# Patient Record
Sex: FEMALE | Race: ASIAN | NOT HISPANIC OR LATINO | Employment: FULL TIME | URBAN - METROPOLITAN AREA
[De-identification: names, ages, dates, MRNs, and addresses within clinical notes are randomized per-mention and may not be internally consistent; named-entity substitution may affect disease eponyms.]

---

## 2017-03-15 ENCOUNTER — OFFICE VISIT - HEALTHEAST (OUTPATIENT)
Dept: INTERNAL MEDICINE | Facility: CLINIC | Age: 57
End: 2017-03-15

## 2017-03-15 DIAGNOSIS — E78.5 HLD (HYPERLIPIDEMIA): ICD-10-CM

## 2017-03-15 DIAGNOSIS — I10 HTN (HYPERTENSION): ICD-10-CM

## 2017-03-15 ASSESSMENT — MIFFLIN-ST. JEOR: SCORE: 987.05

## 2017-04-17 ENCOUNTER — OFFICE VISIT - HEALTHEAST (OUTPATIENT)
Dept: INTERNAL MEDICINE | Facility: CLINIC | Age: 57
End: 2017-04-17

## 2017-04-17 DIAGNOSIS — I10 HTN (HYPERTENSION): ICD-10-CM

## 2017-04-17 DIAGNOSIS — E78.5 HLD (HYPERLIPIDEMIA): ICD-10-CM

## 2017-04-18 ENCOUNTER — AMBULATORY - HEALTHEAST (OUTPATIENT)
Dept: LAB | Facility: CLINIC | Age: 57
End: 2017-04-18

## 2017-04-18 ENCOUNTER — AMBULATORY - HEALTHEAST (OUTPATIENT)
Dept: NURSING | Facility: CLINIC | Age: 57
End: 2017-04-18

## 2017-04-18 ENCOUNTER — COMMUNICATION - HEALTHEAST (OUTPATIENT)
Dept: INTERNAL MEDICINE | Facility: CLINIC | Age: 57
End: 2017-04-18

## 2017-04-18 DIAGNOSIS — I10 HTN (HYPERTENSION): ICD-10-CM

## 2017-04-18 DIAGNOSIS — E78.5 HLD (HYPERLIPIDEMIA): ICD-10-CM

## 2017-04-18 LAB
CHOLEST SERPL-MCNC: 195 MG/DL
FASTING STATUS PATIENT QL REPORTED: ABNORMAL
HDLC SERPL-MCNC: 48 MG/DL
LDLC SERPL CALC-MCNC: 111 MG/DL
TRIGL SERPL-MCNC: 181 MG/DL

## 2017-05-22 ENCOUNTER — COMMUNICATION - HEALTHEAST (OUTPATIENT)
Dept: INTERNAL MEDICINE | Facility: CLINIC | Age: 57
End: 2017-05-22

## 2017-05-24 ENCOUNTER — COMMUNICATION - HEALTHEAST (OUTPATIENT)
Dept: INTERNAL MEDICINE | Facility: CLINIC | Age: 57
End: 2017-05-24

## 2017-05-25 ENCOUNTER — COMMUNICATION - HEALTHEAST (OUTPATIENT)
Dept: INTERNAL MEDICINE | Facility: CLINIC | Age: 57
End: 2017-05-25

## 2017-06-01 ENCOUNTER — COMMUNICATION - HEALTHEAST (OUTPATIENT)
Dept: INTERNAL MEDICINE | Facility: CLINIC | Age: 57
End: 2017-06-01

## 2017-07-20 ENCOUNTER — COMMUNICATION - HEALTHEAST (OUTPATIENT)
Dept: ADMINISTRATIVE | Facility: CLINIC | Age: 57
End: 2017-07-20

## 2017-08-31 ENCOUNTER — OFFICE VISIT - HEALTHEAST (OUTPATIENT)
Dept: INTERNAL MEDICINE | Facility: CLINIC | Age: 57
End: 2017-08-31

## 2017-08-31 DIAGNOSIS — Z12.11 ENCOUNTER FOR SCREENING FECAL OCCULT BLOOD TESTING: ICD-10-CM

## 2017-08-31 DIAGNOSIS — I10 HTN (HYPERTENSION): ICD-10-CM

## 2017-08-31 DIAGNOSIS — K59.00 CONSTIPATION: ICD-10-CM

## 2018-05-08 ENCOUNTER — COMMUNICATION - HEALTHEAST (OUTPATIENT)
Dept: INTERNAL MEDICINE | Facility: CLINIC | Age: 58
End: 2018-05-08

## 2018-05-15 ENCOUNTER — TELEPHONE (OUTPATIENT)
Dept: OTHER | Facility: CLINIC | Age: 58
End: 2018-05-15

## 2018-05-15 NOTE — TELEPHONE ENCOUNTER
5/15/2018    Call Regarding [facility] OTHER    Attempt 1    Message DECLINED    Comments:       Outreach   RA

## 2018-07-16 ENCOUNTER — COMMUNICATION - HEALTHEAST (OUTPATIENT)
Dept: INTERNAL MEDICINE | Facility: CLINIC | Age: 58
End: 2018-07-16

## 2018-09-07 ENCOUNTER — COMMUNICATION - HEALTHEAST (OUTPATIENT)
Dept: INTERNAL MEDICINE | Facility: CLINIC | Age: 58
End: 2018-09-07

## 2018-10-24 ENCOUNTER — OFFICE VISIT - HEALTHEAST (OUTPATIENT)
Dept: INTERNAL MEDICINE | Facility: CLINIC | Age: 58
End: 2018-10-24

## 2018-10-24 DIAGNOSIS — H53.9 VISION CHANGES: ICD-10-CM

## 2018-10-24 DIAGNOSIS — I10 HTN (HYPERTENSION): ICD-10-CM

## 2018-10-24 LAB
ANION GAP SERPL CALCULATED.3IONS-SCNC: 8 MMOL/L (ref 5–18)
BUN SERPL-MCNC: 20 MG/DL (ref 8–22)
CALCIUM SERPL-MCNC: 10.2 MG/DL (ref 8.5–10.5)
CHLORIDE BLD-SCNC: 103 MMOL/L (ref 98–107)
CO2 SERPL-SCNC: 31 MMOL/L (ref 22–31)
CREAT SERPL-MCNC: 0.89 MG/DL (ref 0.6–1.1)
GFR SERPL CREATININE-BSD FRML MDRD: >60 ML/MIN/1.73M2
GLUCOSE BLD-MCNC: 83 MG/DL (ref 70–125)
POTASSIUM BLD-SCNC: 4.3 MMOL/L (ref 3.5–5)
SODIUM SERPL-SCNC: 142 MMOL/L (ref 136–145)

## 2018-10-25 ENCOUNTER — COMMUNICATION - HEALTHEAST (OUTPATIENT)
Dept: INTERNAL MEDICINE | Facility: CLINIC | Age: 58
End: 2018-10-25

## 2019-01-24 ENCOUNTER — OFFICE VISIT - HEALTHEAST (OUTPATIENT)
Dept: INTERNAL MEDICINE | Facility: CLINIC | Age: 59
End: 2019-01-24

## 2019-01-24 DIAGNOSIS — R82.90 CLOUDY URINE: ICD-10-CM

## 2019-01-24 DIAGNOSIS — N95.2 VAGINAL ATROPHY: ICD-10-CM

## 2019-01-24 DIAGNOSIS — I10 ESSENTIAL HYPERTENSION: ICD-10-CM

## 2019-01-24 LAB
ALBUMIN UR-MCNC: NEGATIVE MG/DL
ANION GAP SERPL CALCULATED.3IONS-SCNC: 11 MMOL/L (ref 5–18)
APPEARANCE UR: CLEAR
BILIRUB UR QL STRIP: NEGATIVE
BUN SERPL-MCNC: 17 MG/DL (ref 8–22)
CALCIUM SERPL-MCNC: 9.7 MG/DL (ref 8.5–10.5)
CHLORIDE BLD-SCNC: 102 MMOL/L (ref 98–107)
CO2 SERPL-SCNC: 29 MMOL/L (ref 22–31)
COLOR UR AUTO: YELLOW
CREAT SERPL-MCNC: 1.18 MG/DL (ref 0.6–1.1)
GFR SERPL CREATININE-BSD FRML MDRD: 47 ML/MIN/1.73M2
GLUCOSE BLD-MCNC: 83 MG/DL (ref 70–125)
GLUCOSE UR STRIP-MCNC: NEGATIVE MG/DL
HGB UR QL STRIP: NEGATIVE
KETONES UR STRIP-MCNC: NEGATIVE MG/DL
LEUKOCYTE ESTERASE UR QL STRIP: NEGATIVE
NITRATE UR QL: NEGATIVE
PH UR STRIP: 5.5 [PH] (ref 5–8)
POTASSIUM BLD-SCNC: 3.6 MMOL/L (ref 3.5–5)
SODIUM SERPL-SCNC: 142 MMOL/L (ref 136–145)
SP GR UR STRIP: 1.01 (ref 1–1.03)
UROBILINOGEN UR STRIP-ACNC: NORMAL

## 2019-01-25 ENCOUNTER — AMBULATORY - HEALTHEAST (OUTPATIENT)
Dept: INTERNAL MEDICINE | Facility: CLINIC | Age: 59
End: 2019-01-25

## 2019-01-25 ENCOUNTER — COMMUNICATION - HEALTHEAST (OUTPATIENT)
Dept: INTERNAL MEDICINE | Facility: CLINIC | Age: 59
End: 2019-01-25

## 2019-01-25 DIAGNOSIS — I10 ESSENTIAL HYPERTENSION: ICD-10-CM

## 2019-05-01 ENCOUNTER — COMMUNICATION - HEALTHEAST (OUTPATIENT)
Dept: INTERNAL MEDICINE | Facility: CLINIC | Age: 59
End: 2019-05-01

## 2019-11-05 ENCOUNTER — COMMUNICATION - HEALTHEAST (OUTPATIENT)
Dept: INTERNAL MEDICINE | Facility: CLINIC | Age: 59
End: 2019-11-05

## 2019-11-05 DIAGNOSIS — I10 ESSENTIAL HYPERTENSION: ICD-10-CM

## 2020-01-16 ENCOUNTER — OFFICE VISIT - HEALTHEAST (OUTPATIENT)
Dept: FAMILY MEDICINE | Facility: CLINIC | Age: 60
End: 2020-01-16

## 2020-01-16 DIAGNOSIS — R39.9 UTI SYMPTOMS: ICD-10-CM

## 2020-01-16 DIAGNOSIS — N39.0 URINARY TRACT INFECTION WITHOUT HEMATURIA, SITE UNSPECIFIED: ICD-10-CM

## 2020-01-16 LAB
ALBUMIN UR-MCNC: NEGATIVE MG/DL
APPEARANCE UR: CLEAR
BACTERIA #/AREA URNS HPF: ABNORMAL HPF
BILIRUB UR QL STRIP: NEGATIVE
COLOR UR AUTO: YELLOW
GLUCOSE UR STRIP-MCNC: NEGATIVE MG/DL
HGB UR QL STRIP: ABNORMAL
KETONES UR STRIP-MCNC: NEGATIVE MG/DL
LEUKOCYTE ESTERASE UR QL STRIP: ABNORMAL
NITRATE UR QL: NEGATIVE
PH UR STRIP: 5.5 [PH] (ref 5–8)
RBC #/AREA URNS AUTO: ABNORMAL HPF
SP GR UR STRIP: 1.01 (ref 1–1.03)
SQUAMOUS #/AREA URNS AUTO: ABNORMAL LPF
UROBILINOGEN UR STRIP-ACNC: ABNORMAL
WBC #/AREA URNS AUTO: ABNORMAL HPF
WBC CLUMPS #/AREA URNS HPF: PRESENT /[HPF]

## 2020-01-16 RX ORDER — CETIRIZINE HYDROCHLORIDE 10 MG/1
10 TABLET ORAL
Status: SHIPPED | COMMUNITY
Start: 2016-12-13 | End: 2023-02-01

## 2020-01-18 ENCOUNTER — COMMUNICATION - HEALTHEAST (OUTPATIENT)
Dept: SCHEDULING | Facility: CLINIC | Age: 60
End: 2020-01-18

## 2020-01-18 ENCOUNTER — COMMUNICATION - HEALTHEAST (OUTPATIENT)
Dept: FAMILY MEDICINE | Facility: CLINIC | Age: 60
End: 2020-01-18

## 2020-01-18 DIAGNOSIS — N30.00 ACUTE CYSTITIS WITHOUT HEMATURIA: ICD-10-CM

## 2020-01-18 LAB — BACTERIA SPEC CULT: ABNORMAL

## 2020-02-15 ENCOUNTER — COMMUNICATION - HEALTHEAST (OUTPATIENT)
Dept: INTERNAL MEDICINE | Facility: CLINIC | Age: 60
End: 2020-02-15

## 2020-02-15 DIAGNOSIS — I10 ESSENTIAL HYPERTENSION: ICD-10-CM

## 2020-02-24 ENCOUNTER — OFFICE VISIT - HEALTHEAST (OUTPATIENT)
Dept: INTERNAL MEDICINE | Facility: CLINIC | Age: 60
End: 2020-02-24

## 2020-02-24 DIAGNOSIS — E78.2 MIXED HYPERLIPIDEMIA: ICD-10-CM

## 2020-02-24 DIAGNOSIS — I10 ESSENTIAL HYPERTENSION: ICD-10-CM

## 2020-02-24 LAB
ANION GAP SERPL CALCULATED.3IONS-SCNC: 10 MMOL/L (ref 5–18)
BUN SERPL-MCNC: 22 MG/DL (ref 8–22)
CALCIUM SERPL-MCNC: 9.1 MG/DL (ref 8.5–10.5)
CHLORIDE BLD-SCNC: 102 MMOL/L (ref 98–107)
CHOLEST SERPL-MCNC: 224 MG/DL
CO2 SERPL-SCNC: 29 MMOL/L (ref 22–31)
CREAT SERPL-MCNC: 0.94 MG/DL (ref 0.6–1.1)
FASTING STATUS PATIENT QL REPORTED: ABNORMAL
GFR SERPL CREATININE-BSD FRML MDRD: >60 ML/MIN/1.73M2
GLUCOSE BLD-MCNC: 81 MG/DL (ref 70–125)
HDLC SERPL-MCNC: 62 MG/DL
LDLC SERPL CALC-MCNC: 145 MG/DL
POTASSIUM BLD-SCNC: 3.4 MMOL/L (ref 3.5–5)
SODIUM SERPL-SCNC: 141 MMOL/L (ref 136–145)
TRIGL SERPL-MCNC: 84 MG/DL

## 2020-02-24 ASSESSMENT — MIFFLIN-ST. JEOR: SCORE: 977.52

## 2020-02-25 ENCOUNTER — COMMUNICATION - HEALTHEAST (OUTPATIENT)
Dept: INTERNAL MEDICINE | Facility: CLINIC | Age: 60
End: 2020-02-25

## 2020-03-03 ENCOUNTER — AMBULATORY - HEALTHEAST (OUTPATIENT)
Dept: NURSING | Facility: CLINIC | Age: 60
End: 2020-03-03

## 2020-03-03 ENCOUNTER — COMMUNICATION - HEALTHEAST (OUTPATIENT)
Dept: INTERNAL MEDICINE | Facility: CLINIC | Age: 60
End: 2020-03-03

## 2020-11-10 ENCOUNTER — COMMUNICATION - HEALTHEAST (OUTPATIENT)
Dept: INTERNAL MEDICINE | Facility: CLINIC | Age: 60
End: 2020-11-10

## 2020-11-10 DIAGNOSIS — I10 ESSENTIAL HYPERTENSION: ICD-10-CM

## 2021-03-11 ENCOUNTER — RECORDS - HEALTHEAST (OUTPATIENT)
Dept: ADMINISTRATIVE | Facility: OTHER | Age: 61
End: 2021-03-11

## 2021-04-06 ENCOUNTER — COMMUNICATION - HEALTHEAST (OUTPATIENT)
Dept: INTERNAL MEDICINE | Facility: CLINIC | Age: 61
End: 2021-04-06

## 2021-04-06 DIAGNOSIS — I10 ESSENTIAL HYPERTENSION: ICD-10-CM

## 2021-04-28 ENCOUNTER — OFFICE VISIT - HEALTHEAST (OUTPATIENT)
Dept: INTERNAL MEDICINE | Facility: CLINIC | Age: 61
End: 2021-04-28

## 2021-04-28 DIAGNOSIS — I10 ESSENTIAL HYPERTENSION: ICD-10-CM

## 2021-04-28 DIAGNOSIS — N18.31 STAGE 3A CHRONIC KIDNEY DISEASE (H): ICD-10-CM

## 2021-04-28 LAB
ANION GAP SERPL CALCULATED.3IONS-SCNC: 10 MMOL/L (ref 5–18)
BUN SERPL-MCNC: 26 MG/DL (ref 8–22)
CALCIUM SERPL-MCNC: 9 MG/DL (ref 8.5–10.5)
CHLORIDE BLD-SCNC: 105 MMOL/L (ref 98–107)
CHOLEST SERPL-MCNC: 183 MG/DL
CO2 SERPL-SCNC: 27 MMOL/L (ref 22–31)
CREAT SERPL-MCNC: 1.2 MG/DL (ref 0.6–1.1)
FASTING STATUS PATIENT QL REPORTED: YES
GFR SERPL CREATININE-BSD FRML MDRD: 46 ML/MIN/1.73M2
GLUCOSE BLD-MCNC: 84 MG/DL (ref 70–125)
HDLC SERPL-MCNC: 42 MG/DL
LDLC SERPL CALC-MCNC: 69 MG/DL
POTASSIUM BLD-SCNC: 3.2 MMOL/L (ref 3.5–5)
SODIUM SERPL-SCNC: 142 MMOL/L (ref 136–145)
TRIGL SERPL-MCNC: 359 MG/DL

## 2021-04-28 RX ORDER — LOSARTAN POTASSIUM 100 MG/1
100 TABLET ORAL DAILY
Qty: 90 TABLET | Refills: 3 | Status: SHIPPED | OUTPATIENT
Start: 2021-04-28 | End: 2022-05-07

## 2021-04-28 RX ORDER — HYDROCHLOROTHIAZIDE 25 MG/1
25 TABLET ORAL DAILY
Qty: 90 TABLET | Refills: 3 | Status: SHIPPED | OUTPATIENT
Start: 2021-04-28 | End: 2022-05-07

## 2021-04-28 ASSESSMENT — MIFFLIN-ST. JEOR: SCORE: 977.52

## 2021-05-03 ENCOUNTER — COMMUNICATION - HEALTHEAST (OUTPATIENT)
Dept: INTERNAL MEDICINE | Facility: CLINIC | Age: 61
End: 2021-05-03

## 2021-05-03 DIAGNOSIS — I10 ESSENTIAL HYPERTENSION: ICD-10-CM

## 2021-05-03 DIAGNOSIS — N18.31 STAGE 3A CHRONIC KIDNEY DISEASE (H): ICD-10-CM

## 2021-05-17 ENCOUNTER — RECORDS - HEALTHEAST (OUTPATIENT)
Dept: ADMINISTRATIVE | Facility: OTHER | Age: 61
End: 2021-05-17

## 2021-05-17 ENCOUNTER — OFFICE VISIT - HEALTHEAST (OUTPATIENT)
Dept: INTERNAL MEDICINE | Facility: CLINIC | Age: 61
End: 2021-05-17

## 2021-05-17 DIAGNOSIS — I10 ESSENTIAL HYPERTENSION: ICD-10-CM

## 2021-05-17 DIAGNOSIS — N18.31 STAGE 3A CHRONIC KIDNEY DISEASE (H): ICD-10-CM

## 2021-05-17 DIAGNOSIS — E87.6 HYPOKALEMIA: ICD-10-CM

## 2021-05-17 NOTE — ASSESSMENT & PLAN NOTE
Diagnosis was reviewed with patient and her daughter. She was given handouts regarding this diagnosis. We reviewed the importance of avoidance of NSAIDs, good hydration, good blood pressure control, regular exercise, and healthy diet

## 2021-05-27 VITALS — DIASTOLIC BLOOD PRESSURE: 68 MMHG | HEART RATE: 82 BPM | SYSTOLIC BLOOD PRESSURE: 130 MMHG

## 2021-05-27 VITALS — WEIGHT: 115 LBS

## 2021-05-28 NOTE — TELEPHONE ENCOUNTER
She could consider a Shingrix shingles vaccine but  I would advise that she check with her insurance for the coverage of this expensive vaccine first. Otherwise, she is up to date with vaccines.

## 2021-05-28 NOTE — TELEPHONE ENCOUNTER
Called to daughter and message was relayed - she will contact insurance and call back to schedule if needed.

## 2021-05-28 NOTE — TELEPHONE ENCOUNTER
Who is calling:  The patient's daughter  Reason for Call:  The patient's daughter is calling to check the status of patient's immunizations and what is due for health maintenance.   Date of last appointment with primary care: 1/24/2019  Okay to leave a detailed message: No

## 2021-05-28 NOTE — TELEPHONE ENCOUNTER
"Last OV 01/24/19 - would you recommend any other immunizations - pt is due for follow up in July    IPV 9/6/1978           Measles 9/6/1978          Td,adult,historic,unspecified 9/9/2004, 9/6/1978          Tdap 8/17/2012         <=\"\">  [facility]     <=\"\">  Immunizations as of 9/14/2018 at 5:09 PM     Administered On    FLU-Unspecified (Influenza, inj, historic,unspecified) 12/17/1998             Measles 9/6/1978             OPV, trivalent live (OPV,Trivalent,Historic(5710-1632 only)) 9/6/1978             Td (adult), adsorbed (Td, Adult, Absorbed) 9/9/2004, 9/6/1978             Tdap 8/17/2012              "

## 2021-05-29 ENCOUNTER — RECORDS - HEALTHEAST (OUTPATIENT)
Dept: ADMINISTRATIVE | Facility: CLINIC | Age: 61
End: 2021-05-29

## 2021-05-30 VITALS — BODY MASS INDEX: 25.01 KG/M2 | WEIGHT: 117.6 LBS

## 2021-05-30 VITALS — BODY MASS INDEX: 24.77 KG/M2 | WEIGHT: 118 LBS | HEIGHT: 58 IN

## 2021-05-31 VITALS — WEIGHT: 119 LBS | BODY MASS INDEX: 25.31 KG/M2

## 2021-06-01 ENCOUNTER — RECORDS - HEALTHEAST (OUTPATIENT)
Dept: ADMINISTRATIVE | Facility: CLINIC | Age: 61
End: 2021-06-01

## 2021-06-02 VITALS — BODY MASS INDEX: 24.48 KG/M2 | WEIGHT: 115.1 LBS

## 2021-06-02 VITALS — WEIGHT: 114 LBS | BODY MASS INDEX: 24.24 KG/M2

## 2021-06-03 NOTE — TELEPHONE ENCOUNTER
Refill Approved    Rx renewed per Medication Renewal Policy. Medication was last renewed on 1/24/19.    Tatianna German, [facility] [place] Connection Triage/Med Refill 11/5/2019     Requested Prescriptions   Pending Prescriptions Disp Refills     losartan (COZAAR) 100 MG tablet [Pharmacy Med Name: Losartan Potassium Oral Tablet 100 MG] 90 tablet 0     Sig: Take 1 tablet (100 mg total) by mouth daily.       Angiotensin Receptor Blocker Protocol Passed - 11/5/2019 11:43 AM        Passed - PCP or prescribing provider visit in past 12 months       Last office visit with prescriber/PCP: 1/24/2019 Isidra Marte FNP OR same dept: 1/24/2019 Isidra Marte FNP OR same specialty: 1/24/2019 Isidra Marte FNP  Last physical: Visit date not found Last MTM visit: Visit date not found   Next visit within 3 mo: Visit date not found  Next physical within 3 mo: Visit date not found  Prescriber OR PCP: DARLIN Lees  Last diagnosis associated with med order: 1. Essential hypertension  - losartan (COZAAR) 100 MG tablet [Pharmacy Med Name: Losartan Potassium Oral Tablet 100 MG]; Take 1 tablet (100 mg total) by mouth daily.  Dispense: 90 tablet; Refill: 0    If protocol passes may refill for 12 months if within 3 months of last provider visit (or a total of 15 months).             Passed - Serum potassium within the past 12 months     Lab Results   Component Value Date    Potassium 3.6 01/24/2019             Passed - Blood pressure filed in past 12 months     BP Readings from Last 1 Encounters:   01/24/19 130/80             Passed - Serum creatinine within the past 12 months     Creatinine   Date Value Ref Range Status   01/24/2019 1.18 (H) 0.60 - 1.10 mg/dL Final

## 2021-06-04 ENCOUNTER — AMBULATORY - HEALTHEAST (OUTPATIENT)
Dept: LAB | Facility: CLINIC | Age: 61
End: 2021-06-04

## 2021-06-04 DIAGNOSIS — E87.6 HYPOKALEMIA: ICD-10-CM

## 2021-06-04 DIAGNOSIS — N18.31 STAGE 3A CHRONIC KIDNEY DISEASE (H): ICD-10-CM

## 2021-06-04 LAB
ANION GAP SERPL CALCULATED.3IONS-SCNC: 13 MMOL/L (ref 5–18)
BUN SERPL-MCNC: 17 MG/DL (ref 8–22)
CALCIUM SERPL-MCNC: 9.1 MG/DL (ref 8.5–10.5)
CHLORIDE BLD-SCNC: 103 MMOL/L (ref 98–107)
CO2 SERPL-SCNC: 26 MMOL/L (ref 22–31)
CREAT SERPL-MCNC: 1.08 MG/DL (ref 0.6–1.1)
GFR SERPL CREATININE-BSD FRML MDRD: 52 ML/MIN/1.73M2
GLUCOSE BLD-MCNC: 76 MG/DL (ref 70–125)
POTASSIUM BLD-SCNC: 3.8 MMOL/L (ref 3.5–5)
SODIUM SERPL-SCNC: 142 MMOL/L (ref 136–145)

## 2021-06-05 NOTE — TELEPHONE ENCOUNTER
Called patient and spoke with her, her , and her daughter.  Patient and her family refused to provide her date of birth to verify her identity.  Patient questions why I am asking for her date of birth and requests that I provide her results without this information.  Advised patient that I am unable to relay any information before positively identifying her.  Patient states that she will call back to the clinic to receive her results as she is unwilling to provide her date of birth for me.  Did advise patient and her family that she will need to provide her date of birth when she calls back to the clinic.    When patient returns my call, she needs to be informed that:  1.  Her urine culture has grown Proteus mirabilis, which is resistant to the nitrofurantoin that was prescribed on 1/16/2020.  2.  Recommend that nitrofurantoin be discontinued at this time.  3.  Recommend treating her urinary tract infection with Bactrim DS twice daily for 3 days.  A prescription for this medication has been pended.  RN to please sign this on my behalf when the patient's preferred pharmacy is identified.    Rommel Mendes MD 01/18/20 11:01 AM

## 2021-06-05 NOTE — PROGRESS NOTES
Assessment:     1. Urinary tract infection without hematuria, site unspecified  nitrofurantoin, macrocrystal-monohydrate, (MACROBID) 100 MG capsule   2. UTI symptoms  Urinalysis-UC if Indicated    Culture, Urine          Plan:     Symptoms consistent with a urinary tract infection.  Antibiotics prescribed as noted above.  Urine culture is pending and will adjust antibiotic based on the culture and sensitivities as needed.  Recommend pushing fluids and follow-up if symptoms are getting worse or not improving over the next 2 to 3 days.      Subjective:       59 y.o. female presents for evaluation of a 1 day history of burning with urination and increased urinary frequency and urgency.  She denies any back pain, nausea, vomiting, fevers, hematuria, or vaginal discharge.  She has had some low abdominal discomfort.  No previous history of frequent UTIs.    Patient Active Problem List   Diagnosis     HTN (hypertension)     HLD (hyperlipidemia), 10-year ASCVD risk 3.2% 4/2017     Constipation       Past Medical History:   Diagnosis Date     HLD (hyperlipidemia)      HTN (hypertension)      TIA (transient ischemic attack)        No past surgical history on file.    Current Outpatient Medications on File Prior to Visit   Medication Sig Dispense Refill     cetirizine (ZYRTEC) 10 MG tablet Take 10 mg by mouth.       fluticasone propionate (FLONASE) 50 mcg/actuation nasal spray 1 spray into each nostril.       hydroCHLOROthiazide (MICROZIDE) 12.5 mg capsule Take 1 capsule (12.5 mg total) by mouth daily. 90 capsule 1     losartan (COZAAR) 100 MG tablet Take 1 tablet (100 mg total) by mouth daily. 90 tablet 0     No current facility-administered medications on file prior to visit.        No Known Allergies    No family history on file.    Social History     Socioeconomic History     Marital status:      Spouse name: None     Number of children: 4     Years of education: None     Highest education level: None   Occupational  History     None   Social Needs     Financial resource strain: None     Food insecurity:     Worry: None     Inability: None     Transportation needs:     Medical: None     Non-medical: None   Tobacco Use     Smoking status: Never Smoker     Smokeless tobacco: Never Used   Substance and Sexual Activity     Alcohol use: No     Drug use: No     Sexual activity: None   Lifestyle     Physical activity:     Days per week: None     Minutes per session: None     Stress: None   Relationships     Social connections:     Talks on phone: None     Gets together: None     Attends [employer] service: None     Active member of club or organization: None     Attends meetings of clubs or organizations: None     Relationship status: None     Intimate partner violence:     Fear of current or ex partner: None     Emotionally abused: None     Physically abused: None     Forced sexual activity: None   Other Topics Concern     None   Social History Narrative     None         Review of Systems  A 12 point comprehensive review of systems was negative except as noted.      Objective:      /84   Pulse 67   Temp 97.4  F (36.3  C) (Oral)   Resp 12   Wt 111 lb 12.8 oz (50.7 kg)   SpO2 98%   Breastfeeding No   BMI 23.77 kg/m    General appearance: alert, appears stated age and cooperative  Back: No CVA tenderness.  Abdomen: soft, non-tender; bowel sounds normal; no masses,  no organomegaly  Extremities: extremities normal, atraumatic, no cyanosis or edema  Skin: Skin color, texture, turgor normal. No rashes or lesions     Recent Results (from the past 24 hour(s))   Urinalysis-UC if Indicated   Result Value Ref Range    Color, UA Yellow Colorless, Yellow, Straw, Light Yellow    Clarity, UA Clear Clear    Glucose, UA Negative Negative    Bilirubin, UA Negative Negative    Ketones, UA Negative Negative    Specific Gravity, UA 1.015 1.005 - 1.030    Blood, UA Large (!) Negative    pH, UA 5.5 5.0 - 8.0    Protein, UA Negative Negative  mg/dL    Urobilinogen, UA 0.2 E.U./dL 0.2 E.U./dL, 1.0 E.U./dL    Nitrite, UA Negative Negative    Leukocytes, UA Small (!) Negative    Bacteria, UA None Seen None Seen hpf    RBC, UA 10-25 (!) None Seen, 0-2 hpf    WBC, UA 5-10 (!) None Seen, 0-5 hpf    Squam Epithel, UA None Seen None Seen, 0-5 lpf    WBC Clumps Present (!) None Seen             This note has been dictated using voice recognition software. Any grammatical or context distortions are unintentional and inherent to the software

## 2021-06-05 NOTE — TELEPHONE ENCOUNTER
Call from daughter         I did relate that the ABX had changed   - mom to stop current ABX and start new one that will be sent in today       She will let mom know        Yariel Sheets RN   Triage and Medication Refills

## 2021-06-06 NOTE — TELEPHONE ENCOUNTER
Refill Request  Did you contact pharmacy: Yes  Medication name:   Requested Prescriptions     Pending Prescriptions Disp Refills     losartan (COZAAR) 100 MG tablet [Pharmacy Med Name: Losartan Potassium Oral Tablet 100 MG] 90 tablet 0     Sig: Take 1 tablet (100 mg total) by mouth daily.     hydroCHLOROthiazide (MICROZIDE) 12.5 mg capsule [Pharmacy Med Name: hydroCHLOROthiazide Oral Capsule 12.5 MG] 90 capsule 0     Sig: Take 1 capsule (12.5 mg total) by mouth daily.     Who prescribed the medication:   Isidra Marte FNP  Requested Pharmacy: [id number]  Is patient out of medication: Yes  Patient notified refills processed in 3 business days:  yes  Okay to leave a detailed message: yes      Patient is out of Losartan  Patient is not out of Hydrochlorothiazide  Patient would like to  Losartan today.  Thank you.

## 2021-06-06 NOTE — TELEPHONE ENCOUNTER
Call patient: He is due for follow-up in the office and lab work. Please schedule for further refills.

## 2021-06-06 NOTE — TELEPHONE ENCOUNTER
Attempted to contact patient- phone kept ringing and ringing- no answer.    Will try to call again

## 2021-06-06 NOTE — PROGRESS NOTES
Internal Medicine Office Visit  [facility]   Patient Name: Adam Jernigan  Patient Age: 59 y.o.  YOB: 1960  MRN: [id number]    Date of Visit: 2020  Reason for Office Visit:   Chief Complaint   Patient presents with     Medication Management           Assessment / Plan / Medical Decision Makin. Essential hypertension  - Blood pressure is high today, will increase HCTZ. She will return in 2 weeks for a nurse BP check   - CONTINUE: losartan (COZAAR) 100 MG tablet; Take 1 tablet (100 mg total) by mouth daily.  Dispense: 90 tablet; Refill: 1  - INCREASE from 12.5 to:  hydroCHLOROthiazide (HYDRODIURIL) 25 MG tablet; Take 1 tablet (25 mg total) by mouth daily.  Dispense: 90 tablet; Refill: 1  - Basic Metabolic Panel  - Lipid Profile    She declines a PAP smear, mammogram, colonoscopy/alternative forms of colon cancer screening, shingles vaccine.     Health Maintenance Review  Health Maintenance   Topic Date Due     PREVENTIVE CARE VISIT  1960     ADVANCE CARE PLANNING  1978     PAP SMEAR  1981     COLONOSCOPY  2010     ZOSTER VACCINES (1 of 2) 2010     MAMMOGRAM  2020 (Originally 1960)     TD 18+ HE  2022     LIPID  [id number]     INFLUENZA VACCINE RULE BASED  Completed     TDAP ADULT ONE TIME DOSE  Completed     HEPATITIS C SCREENING  Discontinued     HIV SCREENING  Discontinued         I have changed Adam Jernigan's losartan. I am also having her start on hydroCHLOROthiazide. Additionally, I am having her maintain her fluticasone propionate, cetirizine, and hydroCHLOROthiazide.      HPI:  Adam Jernigan is a 59 y.o. year old who presents to the office today for follow up of HTN. Her blood pressure is elevated today. She notes that her home readings have also been higher, up to 160 systolic. She does not have any concerns regarding her medication. She denies chest pain or dyspnea.     She is due for a number of preventative screening tests  "and vaccines. Theses were reviewed with her today including benefits, risks, alternatives.       Review of Systems- pertinent positive in bold:  A 10-point ROS is negative except as stated in HPI         Current Scheduled Meds:  Outpatient Encounter Medications as of 2/24/2020   Medication Sig Dispense Refill     cetirizine (ZYRTEC) 10 MG tablet Take 10 mg by mouth.       fluticasone propionate (FLONASE) 50 mcg/actuation nasal spray 1 spray into each nostril.       hydroCHLOROthiazide (MICROZIDE) 12.5 mg capsule Take 1 capsule (12.5 mg total) by mouth daily. Due for appointment 30 capsule 0     [DISCONTINUED] losartan (COZAAR) 100 MG tablet Take 1 tablet (100 mg total) by mouth daily. Due for appointment 30 tablet 0     hydroCHLOROthiazide (HYDRODIURIL) 25 MG tablet Take 1 tablet (25 mg total) by mouth daily. 90 tablet 1     losartan (COZAAR) 100 MG tablet Take 1 tablet (100 mg total) by mouth daily. 90 tablet 1     No facility-administered encounter medications on file as of 2/24/2020.          Past Medical History:   Diagnosis Date     HLD (hyperlipidemia)      HTN (hypertension)      TIA (transient ischemic attack)      History reviewed. No pertinent surgical history.  Social History     Tobacco Use     Smoking status: Never Smoker     Smokeless tobacco: Never Used   Substance Use Topics     Alcohol use: No     Drug use: No       Objective / Physical Examination:  Vitals:    02/24/20 0939   BP: 146/90   Pulse: 64   Weight: 117 lb (53.1 kg)   Height: 4' 9.5\" (1.461 m)     Wt Readings from Last 3 Encounters:   02/24/20 117 lb (53.1 kg)   01/16/20 111 lb 12.8 oz (50.7 kg)   01/24/19 115 lb 1.6 oz (52.2 kg)     Body mass index is 24.88 kg/m .     Constitutional: In no apparent distress  Respiratory: Clear to auscultation bilaterally. Normal inspiratory and expiratory effort  Cardiovascular: Regular rate and rhythm. No murmurs, rubs, or gallops. No edema. No carotid bruits.   Gastrointestinal: Bowel sounds active all " four quadrants. Soft, non-tender.   Psych: Alert and oriented x3.     Orders Placed This Encounter   Procedures     Basic Metabolic Panel     Lipid Profile   Followup: Return in about 6 months (around 8/24/2020) for Recheck. earlier if needed.      Isidra Marte, CNP

## 2021-06-06 NOTE — PROGRESS NOTES
I met with Adam Jernigan at the request of Isidra Marte to recheck her blood pressure.  Blood pressure medications on the MAR were reviewed with patient.    Patient has taken all medications as per usual regimen: Yes  Patient reports tolerating them without any issues or concerns: Yes    Vitals:    03/03/20 1430   BP: 126/68       Blood pressure was taken, previous encounter was reviewed, recorded blood pressure below 140/90.  Patient was discharged and the note will be sent to the provider for final review.

## 2021-06-06 NOTE — TELEPHONE ENCOUNTER
Call patient: her blood pressure is much better. She should continue the higher dose of HCTZ and losartan

## 2021-06-06 NOTE — TELEPHONE ENCOUNTER
RN cannot approve Refill Request    RN can NOT refill this medication Protocol failed and NO refill given.       Nallely Viveros, [facility] Triage/Med Refill 2/19/2020    Requested Prescriptions   Pending Prescriptions Disp Refills     losartan (COZAAR) 100 MG tablet [Pharmacy Med Name: Losartan Potassium Oral Tablet 100 MG] 90 tablet 3     Sig: Take 1 tablet (100 mg total) by mouth daily.       Angiotensin Receptor Blocker Protocol Failed - 2/19/2020  2:17 PM        Failed - PCP or prescribing provider visit in past 12 months       Last office visit with prescriber/PCP: 1/24/2019 Isidra Marte FNP OR same dept: Visit date not found OR same specialty: 1/24/2019 Isidra Marte FNP  Last physical: Visit date not found Last MTM visit: Visit date not found   Next visit within 3 mo: Visit date not found  Next physical within 3 mo: Visit date not found  Prescriber OR PCP: DARLIN Lees  Last diagnosis associated with med order: 1. Essential hypertension  - losartan (COZAAR) 100 MG tablet [Pharmacy Med Name: Losartan Potassium Oral Tablet 100 MG]; Take 1 tablet (100 mg total) by mouth daily.  Dispense: 90 tablet; Refill: 0  - hydroCHLOROthiazide (MICROZIDE) 12.5 mg capsule [Pharmacy Med Name: hydroCHLOROthiazide Oral Capsule 12.5 MG]; Take 1 capsule (12.5 mg total) by mouth daily.  Dispense: 90 capsule; Refill: 0    If protocol passes may refill for 12 months if within 3 months of last provider visit (or a total of 15 months).             Failed - Serum potassium within the past 12 months     No results found for: LN-POTASSIUM          Failed - Serum creatinine within the past 12 months     Creatinine   Date Value Ref Range Status   01/24/2019 1.18 (H) 0.60 - 1.10 mg/dL Final             Passed - Blood pressure filed in past 12 months     BP Readings from Last 1 Encounters:   01/16/20 150/84             hydroCHLOROthiazide (MICROZIDE) 12.5 mg capsule [Pharmacy Med Name: hydroCHLOROthiazide Oral Capsule  12.5 MG] 90 capsule 3     Sig: Take 1 capsule (12.5 mg total) by mouth daily.       Diuretics/Combination Diuretics Refill Protocol  Failed - 2/19/2020  2:17 PM        Failed - Visit with PCP or prescribing provider visit in past 12 months     Last office visit with prescriber/PCP: 1/24/2019 Isidra Marte FNP OR same dept: Visit date not found OR same specialty: 1/24/2019 Isidra Marte FNP  Last physical: Visit date not found Last MTM visit: Visit date not found   Next visit within 3 mo: Visit date not found  Next physical within 3 mo: Visit date not found  Prescriber OR PCP: DARLIN Lees  Last diagnosis associated with med order: 1. Essential hypertension  - losartan (COZAAR) 100 MG tablet [Pharmacy Med Name: Losartan Potassium Oral Tablet 100 MG]; Take 1 tablet (100 mg total) by mouth daily.  Dispense: 90 tablet; Refill: 0  - hydroCHLOROthiazide (MICROZIDE) 12.5 mg capsule [Pharmacy Med Name: hydroCHLOROthiazide Oral Capsule 12.5 MG]; Take 1 capsule (12.5 mg total) by mouth daily.  Dispense: 90 capsule; Refill: 0    If protocol passes may refill for 12 months if within 3 months of last provider visit (or a total of 15 months).             Failed - Serum Potassium in past 12 months      No results found for: LN-POTASSIUM          Failed - Serum Sodium in past 12 months      No results found for: LN-SODIUM          Failed - Serum Creatinine in past 12 months      Creatinine   Date Value Ref Range Status   01/24/2019 1.18 (H) 0.60 - 1.10 mg/dL Final             Passed - Blood pressure on file in past 12 months     BP Readings from Last 1 Encounters:   01/16/20 150/84

## 2021-06-07 ENCOUNTER — COMMUNICATION - HEALTHEAST (OUTPATIENT)
Dept: INTERNAL MEDICINE | Facility: CLINIC | Age: 61
End: 2021-06-07

## 2021-06-09 NOTE — PROGRESS NOTES
Internal Medicine Office Visit  Patient Name: Adam Jernigan  Patient Age: 56 y.o.  YOB: 1960  MRN: [id number]  ?  Date of Visit: 3/15/2017  Reason for Office Visit:   Chief Complaint   Patient presents with     New patient     Med check. would like a change in medication       Assessment / Plan / Medical Decision Makin. HTN (hypertension)  2. HLD (hyperlipidemia)    - Patient declines treatment for hyperlipidemia.  She was previously taking simvastatin.  I do not have a copy of her recent lab assessment [facility] on 2/10/17 through [employer] so these records will be requested.  Last chemistry available is 12/15 which shows normal creatine. If no labs since this time this will need to be rechecked    - Continue losartan 100 mg daily and add HCTZ 12.5 mg daily. I anticipate this will need to be increased but she is very reluctant to add any additional medication at all so is more comfortable with a very low dose first.  We reviewed risk factors with untreated HTN  - Follow up in 4 weeks    Health Maintenance Review  Health Maintenance   Topic Date Due     MAMMOGRAM  1960     COLONOSCOPY  1978     ADVANCE DIRECTIVES DISCUSSED WITH PATIENT  1978     PAP SMEAR  1985     INFLUENZA VACCINE RULE BASED (1) 2016     TD 18+ HE  2022     TDAP ADULT ONE TIME DOSE  Completed         I am having Ms. Jernigan maintain her losartan and hydroCHLOROthiazide.     HPI:   Encounter Diagnoses   Name Primary?     HTN (hypertension)      HLD (hyperlipidemia)       Adam Jernigan is a 57 y/o female presents to the office today for follow-up and to establish care.  She states that she has been taking medication for hypertension for the past 3 years approximately.  She has always been reluctant to take any medication and has stopped taking medications on her own in the past because she was concerned of side effects.  She is  "currently noticing some \"hard stools\" and attributes this to losartan.  She is unable to recall how long she has taken this medication but states that her dose was increased to 100 mg daily on 2/10/17 which is the date on her current bottle.  She tells me she recently had a headache which is what prompted her to follow-up in her previous primary care provider's office and when the dose adjustment was made.    She has a history of hyperlipidemia and was previously taking simvastatin.  She stopped taking this and adamantly refuses to restart medication for cholesterol.    She tells me that she was diagnosed with hepatitis B approximately 3 years ago but then when a blood pressure medicine was discontinued that this resolved.  I am unclear the meaning of this, there is nothing in her available Care Everywhere chart that I see indicating this history. She was offered a  but declined. Her daughter occasionally asked her questions for clarification but she is very against a professional  although there are times that there appears to be a language barrier from her answers to my questions.     Review of Systems: If positive, the following ROS is bolded:  Constitutional: Fever, chills, night sweats fainting, unexplained weight change  Eyes: Visual changes, eye pain, double vision  HENT: Changes in hearing, ear pain, tinnitus, hoarseness, difficulty swallowing  Respiratory: Wheeze, shortness of breath, cough, and exercise intolerance   Cardiovascular: Chest pain, dyspnea, tachycardia, palpitations, syncope, dizziness or lightheadedness  Gastrointestinal: Nausea, vomiting, diarrhea, dyspepsia, irregular BMs, and melena, \"hard stools\"  Genitourinary: Dysuria, frequency, hematuria, nocturia  Integumentary: Pruritis, rashes, lesions, wounds   Musculoskeletal: Muscular or joint pain, difficulty with range of motion  Neurological: Changes in balance, mental status, paresthesias, weakness, " "headache  Behavioral/Psych: Difficulty concentrating, excessive moodiness, depression or anxiety, insomnia    Current Scheduled Meds:  Outpatient Encounter Prescriptions as of 3/15/2017   Medication Sig Dispense Refill     losartan (COZAAR) 100 MG tablet   2     hydroCHLOROthiazide (MICROZIDE) 12.5 mg capsule Take 1 capsule (12.5 mg total) by mouth daily. 30 capsule 1     [DISCONTINUED] hydroCHLOROthiazide (MICROZIDE) 12.5 mg capsule Take 1 capsule (12.5 mg total) by mouth daily. 30 capsule 1     No facility-administered encounter medications on file as of 3/15/2017.      Past Medical History:   Diagnosis Date     HLD (hyperlipidemia)      HTN (hypertension)      History reviewed. No pertinent surgical history.  Social History   Substance Use Topics     Smoking status: Never Smoker     Smokeless tobacco: Never Used     Alcohol use No       Objective / Physical Examination:  Vitals:    03/15/17 1436 03/15/17 1507   BP: 150/90 148/90   Patient Site: Left Arm Left Arm   Patient Position: Sitting Sitting   Cuff Size: Adult Regular Adult Regular   Pulse: 68    Weight: 118 lb (53.5 kg)    Height: 4' 9.5\" (1.461 m)      Wt Readings from Last 3 Encounters:   03/15/17 118 lb (53.5 kg)     Body mass index is 25.09 kg/(m^2).      General Appearance: Alert and oriented, cooperative, affect appropriate, speech clear, in no apparent distress  Neck: Supple, trachea midline. No carotid bruits   Lungs: Clear to auscultation bilaterally. Normal inspiratory and expiratory effort  Cardiovascular: Regular rate, normal S1, S2. No murmurs, rubs, or gallops      No orders of the defined types were placed in this encounter.  Followup: Return in about 4 weeks (around 4/12/2017) for Recheck. earlier if needed.    Total time spent with patient was 30 minutes with >50% of time spent in face-to-face counseling regarding the above plan, discussing her medical history, and in review of the risks vs. Benefits of treating HTN and HLD       Isidra TO" Estuardo, CNP  [facility]

## 2021-06-10 NOTE — PROGRESS NOTES
Internal Medicine Office Visit  Patient Name: Adam Jernigan  Patient Age: 56 y.o.  YOB: 1960  MRN: [id number]  ?  Date of Visit: 2017  Reason for Office Visit:   Chief Complaint   Patient presents with     Follow-up     blood pressure       Assessment / Plan / Medical Decision Makin. HLD (hyperlipidemia)  2. HTN (hypertension)  -Blood pressure is elevated today but she recently returned from a family member's .  She will return to the office tomorrow for a blood pressure recheck.  Blood pressure remains elevated, will increase hydrochlorothiazide to 25 mg daily  -10 year ASCVD risk is low at 3.2%, recommend healthy diet, regular exercise for lipid management.      Health Maintenance Review  Health Maintenance   Topic Date Due     MAMMOGRAM  1960     COLONOSCOPY  1978     ADVANCE DIRECTIVES DISCUSSED WITH PATIENT  1978     PAP SMEAR  1985     INFLUENZA VACCINE RULE BASED (1) 2016     TD 18+ HE  2022     TDAP ADULT ONE TIME DOSE  Completed     Reviewed screening tests recommended for patient's age and risk factors.  She is advised to have a colonoscopy and mammogram, declines both of these.  Discussed alternatives to colonoscopy which she is not in favor of today.  She may consider occult blood stool test in the future but is not presently interested.      I have changed Ms. Jernigan's losartan. I am also having her maintain her hydroCHLOROthiazide.     HPI:   Encounter Diagnoses   Name Primary?     HLD (hyperlipidemia) Yes     HTN (hypertension)       Adam Jernigan is a 56-year-old female who presents to the office today for follow-up.    She has a history of hypertension and was very reluctant to restart medication for this.  She is now taking losartan 100 mg daily and hydrochlorothiazide 12.5 mg daily.  States that she is tolerating the medications well and has not suffered any side effects since beginning the medications.    She had a recent lipid panel  drawn.    Review of Systems: If positive, the following ROS is bolded:  Constitutional: Fever, chills, night sweats fainting, unexplained weight change  Eyes: Visual changes, eye pain, double vision  HENT: Changes in hearing, ear pain, tinnitus, hoarseness, difficulty swallowing  Respiratory: Wheeze, shortness of breath, cough, and exercise intolerance   Cardiovascular: Chest pain, dyspnea, tachycardia, palpitations, syncope, dizziness or lightheadedness  Gastrointestinal: Nausea, vomiting, diarrhea, dyspepsia, irregular BMs, and melena   Genitourinary: Dysuria, frequency, hematuria, nocturia  Integumentary: Pruritis, rashes, lesions, wounds   Musculoskeletal: Muscular or joint pain, difficulty with range of motion  Neurological: Changes in balance, mental status, paresthesias, weakness, headache  Behavioral/Psych: Difficulty concentrating, excessive moodiness, depression or anxiety, insomnia    Current Scheduled Meds:  Outpatient Encounter Prescriptions as of 4/17/2017   Medication Sig Dispense Refill     hydroCHLOROthiazide (MICROZIDE) 12.5 mg capsule Take 1 capsule (12.5 mg total) by mouth daily. 30 capsule 1     losartan (COZAAR) 100 MG tablet Take 1 tablet (100 mg total) by mouth daily. 90 tablet 2     [DISCONTINUED] losartan (COZAAR) 100 MG tablet   2     No facility-administered encounter medications on file as of 4/17/2017.      Past Medical History:   Diagnosis Date     HLD (hyperlipidemia)      HTN (hypertension)      History reviewed. No pertinent surgical history.  Social History   Substance Use Topics     Smoking status: Never Smoker     Smokeless tobacco: Never Used     Alcohol use No       Objective / Physical Examination:  Vitals:    04/17/17 1541 04/17/17 1624   BP: 160/64 160/84   Patient Site: Right Arm Right Arm   Patient Position: Sitting Sitting   Cuff Size: Adult Regular Adult Regular   Pulse: (!) 48    Weight: 117 lb 9.6 oz (53.3 kg)      Wt Readings from Last 3 Encounters:   04/17/17 117 lb  9.6 oz (53.3 kg)   03/15/17 118 lb (53.5 kg)     Body mass index is 25.01 kg/(m^2). (>25?)    General Appearance: Alert and oriented, cooperative, affect appropriate, speech clear, in no apparent distress  Head: Normocephalic, atraumatic  Ears: Tympanic membrane clear with landmarks well visualized bilaterally  Eyes: PERRL, fundi appear clear bilaterally. No AV nicking or microhemmorhages. Conjunctivae clear and sclerae non-icteric  Nose: Septum midline, nares patent, no visible polyps, mucosa moist and without drainage  Throat: Lips and mucosa moist. Teeth in good repair, pharynx without erythema or exudate  Neck: Supple, trachea midline. No cervical adenopathy  Back: Symmetrical and nontender  Lungs: Clear to auscultation bilaterally. Normal inspiratory and expiratory effort  Cardiovascular: Regular rate, normal S1, S2. No murmurs, rubs, or gallops  Abdomen: Bowel sounds active all four quadrants. Soft, non-tender. No hepatomegaly or splenomegaly. No bruits detected.   Extremities: Pulses 2+ and equal throughout. No edema. Strength equal throughout.  Integumentary: Warm and dry. Without suspicious looking lesions  Neuro: Alert and oriented, follows commands appropriately.     Orders Placed This Encounter   Procedures     Lipid Profile     Comprehensive Metabolic Panel   Followup: Return in about 1 week (around 4/24/2017) for Recheck BP. earlier if needed.      Isidra Marte, CNP  [facility]

## 2021-06-12 NOTE — PROGRESS NOTES
Internal Medicine Office Visit  Patient Name: Adam Jernigan  Patient Age: 56 y.o.  YOB: 1960  MRN: [id number]  ?  Date of Visit: 2017  Reason for Office Visit:   Chief Complaint   Patient presents with     Medication Management       Assessment / Plan / Medical Decision Makin. HTN (hypertension)  2. Encounter for screening fecal occult blood testing  3. Constipation   - She is advised to take HTN medications only as ordered, no extra doses. Rationale reviewed with patient and she understands. Will follow up if blood pressure readings are consistently >140/90 at home otherwise annually   - Occult Blood(ICT); Future  - Miralax as needed for constipation       Health Maintenance Review  Health Maintenance   Topic Date Due     MAMMOGRAM  1960     ADVANCE DIRECTIVES DISCUSSED WITH PATIENT  1978     PAP SMEAR  1990     COLONOSCOPY  2010     INFLUENZA VACCINE RULE BASED (1) 2017     TD 18+ HE  2022     TDAP ADULT ONE TIME DOSE  Completed           I have discontinued Ms. Jernigan's hydroCHLOROthiazide. I have also changed her hydroCHLOROthiazide. Additionally, I am having her start on polyethylene glycol. Lastly, I am having her maintain her losartan.     HPI:   Encounter Diagnoses   Name Primary?     HTN (hypertension) Yes     Encounter for screening fecal occult blood testing      Constipation       Adam Jernigan is a 57 y/o female who presents to the office today for follow up.     Home blood pressure 134/70. She tells me that sometimes she decides to take an extra dose of her HCTZ medication if she feels like her blood pressure is higher.     She has had more constipation recently. She has to push and strain to have a BM. Sometimes she will go 1-2 days without a bowel movement. She has bloating and a full feeling when she has not had a BM in a while. No melena or hematochezia.    We reviewed recommended cancer screening tests. She declines a mammogram. Is willing to do  an occult blood stool test for colon cancer screening.     Review of Systems: No CP, dyspnea     Current Scheduled Meds:  Outpatient Encounter Prescriptions as of 8/31/2017   Medication Sig Dispense Refill     hydroCHLOROthiazide (MICROZIDE) 12.5 mg capsule Take 1 capsule (12.5 mg total) by mouth daily. 90 capsule 3     losartan (COZAAR) 100 MG tablet Take 1 tablet (100 mg total) by mouth daily. 90 tablet 3     [DISCONTINUED] hydroCHLOROthiazide (HYDRODIURIL) 25 MG tablet Take 1 tablet (25 mg total) by mouth daily. 90 tablet 1     [DISCONTINUED] hydroCHLOROthiazide (MICROZIDE) 12.5 mg capsule TAKE ONE CAPSULE BY MOUTH ONE TIME DAILY  90 capsule 3     [DISCONTINUED] losartan (COZAAR) 100 MG tablet Take 1 tablet (100 mg total) by mouth daily. 90 tablet 2     polyethylene glycol (GLYCOLAX) 17 gram/dose powder Take 17 g by mouth daily. 510 g 0     No facility-administered encounter medications on file as of 8/31/2017.      Past Medical History:   Diagnosis Date     HLD (hyperlipidemia)      HTN (hypertension)      History reviewed. No pertinent surgical history.  Social History   Substance Use Topics     Smoking status: Never Smoker     Smokeless tobacco: Never Used     Alcohol use No       Objective / Physical Examination:  Vitals:    08/31/17 0824   BP: 124/72   Pulse: 67   Weight: 119 lb (54 kg)     Wt Readings from Last 3 Encounters:   08/31/17 119 lb (54 kg)   04/17/17 117 lb 9.6 oz (53.3 kg)   03/15/17 118 lb (53.5 kg)     Body mass index is 25.31 kg/(m^2).    General Appearance: Alert and oriented, cooperative, affect appropriate, speech clear, in no apparent distress  Lungs: Clear to auscultation bilaterally. Normal inspiratory and expiratory effort  Cardiovascular: Regular rate, normal S1, S2. No murmurs, rubs, or gallops        Orders Placed This Encounter   Procedures     Occult Blood(ICT)   Followup: Return in about 1 year (around 8/31/2018) for Annual physical. earlier if needed.      Isidra Marte,  CNP  [facility]

## 2021-06-13 NOTE — TELEPHONE ENCOUNTER
Refill Approved    Rx renewed per Medication Renewal Policy. Medication was last renewed on 2/24/20, last OV 2/24/20.    Nubia Dickey, [facility] Triage/Med Refill 11/15/2020     Requested Prescriptions   Pending Prescriptions Disp Refills     losartan (COZAAR) 100 MG tablet 90 tablet 1     Sig: Take 1 tablet (100 mg total) by mouth daily.       Angiotensin Receptor Blocker Protocol Passed - 11/10/2020  3:46 PM        Passed - PCP or prescribing provider visit in past 12 months       Last office visit with prescriber/PCP: 2/24/2020 Isidra Marte FNP OR same dept: 2/24/2020 Isidra Marte FNP OR same specialty: 2/24/2020 Isidra Marte FNP  Last physical: Visit date not found Last MTM visit: Visit date not found   Next visit within 3 mo: Visit date not found  Next physical within 3 mo: Visit date not found  Prescriber OR PCP: DARLIN Lees  Last diagnosis associated with med order: 1. Essential hypertension  - losartan (COZAAR) 100 MG tablet; Take 1 tablet (100 mg total) by mouth daily.  Dispense: 90 tablet; Refill: 1  - hydroCHLOROthiazide (HYDRODIURIL) 25 MG tablet; Take 1 tablet (25 mg total) by mouth daily.  Dispense: 90 tablet; Refill: 1    If protocol passes may refill for 12 months if within 3 months of last provider visit (or a total of 15 months).             Passed - Serum potassium within the past 12 months     Lab Results   Component Value Date    Potassium 3.4 (L) 02/24/2020             Passed - Blood pressure filed in past 12 months     BP Readings from Last 1 Encounters:   03/03/20 126/68             Passed - Serum creatinine within the past 12 months     Creatinine   Date Value Ref Range Status   02/24/2020 0.94 0.60 - 1.10 mg/dL Final                hydroCHLOROthiazide (HYDRODIURIL) 25 MG tablet 90 tablet 1     Sig: Take 1 tablet (25 mg total) by mouth daily.       Diuretics/Combination Diuretics Refill Protocol  Passed - 11/10/2020  3:46 PM        Passed - Visit with  PCP or prescribing provider visit in past 12 months     Last office visit with prescriber/PCP: 2/24/2020 Isidra Marte FNP OR same dept: 2/24/2020 Isidra Marte FNP OR same specialty: 2/24/2020 Isidra Marte FNP  Last physical: Visit date not found Last MTM visit: Visit date not found   Next visit within 3 mo: Visit date not found  Next physical within 3 mo: Visit date not found  Prescriber OR PCP: DARLIN Lees  Last diagnosis associated with med order: 1. Essential hypertension  - losartan (COZAAR) 100 MG tablet; Take 1 tablet (100 mg total) by mouth daily.  Dispense: 90 tablet; Refill: 1  - hydroCHLOROthiazide (HYDRODIURIL) 25 MG tablet; Take 1 tablet (25 mg total) by mouth daily.  Dispense: 90 tablet; Refill: 1    If protocol passes may refill for 12 months if within 3 months of last provider visit (or a total of 15 months).             Passed - Serum Potassium in past 12 months      Lab Results   Component Value Date    Potassium 3.4 (L) 02/24/2020             Passed - Serum Sodium in past 12 months      Lab Results   Component Value Date    Sodium 141 02/24/2020             Passed - Blood pressure on file in past 12 months     BP Readings from Last 1 Encounters:   03/03/20 126/68             Passed - Serum Creatinine in past 12 months      Creatinine   Date Value Ref Range Status   02/24/2020 0.94 0.60 - 1.10 mg/dL Final

## 2021-06-15 PROBLEM — I10 HTN (HYPERTENSION): Status: ACTIVE | Noted: 2017-03-15

## 2021-06-15 PROBLEM — E78.5 HLD (HYPERLIPIDEMIA): Status: ACTIVE | Noted: 2017-03-15

## 2021-06-16 PROBLEM — K59.00 CONSTIPATION: Status: ACTIVE | Noted: 2017-09-06

## 2021-06-16 NOTE — TELEPHONE ENCOUNTER
RN cannot approve Refill Request    RN can NOT refill this medication PCP messaged that patient is overdue for Office Visit. Last office visit: 2/24/2020 Isidra Marte FNP Last Physical: Visit date not found Last MTM visit: Visit date not found Last visit same specialty: 2/24/2020 Isidra Marte FNP.  Next visit within 3 mo: Visit date not found  Next physical within 3 mo: Visit date not found      Nubia Dickey, [facility] Triage/Med Refill 4/6/2021    Requested Prescriptions   Pending Prescriptions Disp Refills     hydroCHLOROthiazide (HYDRODIURIL) 25 MG tablet [Pharmacy Med Name: hydroCHLOROthiazide Oral Tablet 25 MG] 90 tablet 0     Sig: TAKE ONE TABLET BY MOUTH ONE TIME DAILY       Diuretics/Combination Diuretics Refill Protocol  Failed - 4/6/2021  2:54 PM        Failed - Visit with PCP or prescribing provider visit in past 12 months     Last office visit with prescriber/PCP: 2/24/2020 Isidra Marte FNP OR same dept: Visit date not found OR same specialty: 2/24/2020 Isidra Marte FNP  Last physical: Visit date not found Last MTM visit: Visit date not found   Next visit within 3 mo: Visit date not found  Next physical within 3 mo: Visit date not found  Prescriber OR PCP: DARLIN Lees  Last diagnosis associated with med order: 1. Essential hypertension  - hydroCHLOROthiazide (HYDRODIURIL) 25 MG tablet [Pharmacy Med Name: hydroCHLOROthiazide Oral Tablet 25 MG]; TAKE ONE TABLET BY MOUTH ONE TIME DAILY   Dispense: 90 tablet; Refill: 0  - losartan (COZAAR) 100 MG tablet [Pharmacy Med Name: Losartan Potassium Oral Tablet 100 MG]; TAKE ONE TABLET BY MOUTH ONE TIME DAILY   Dispense: 90 tablet; Refill: 0    If protocol passes may refill for 12 months if within 3 months of last provider visit (or a total of 15 months).             Failed - Serum Potassium in past 12 months      No results found for: LN-POTASSIUM          Failed - Serum Sodium in past 12 months      No results found  for: LN-SODIUM          Failed - Blood pressure on file in past 12 months     BP Readings from Last 1 Encounters:   03/03/20 126/68             Failed - Serum Creatinine in past 12 months      Creatinine   Date Value Ref Range Status   02/24/2020 0.94 0.60 - 1.10 mg/dL Final                losartan (COZAAR) 100 MG tablet [Pharmacy Med Name: Losartan Potassium Oral Tablet 100 MG] 90 tablet 0     Sig: TAKE ONE TABLET BY MOUTH ONE TIME DAILY       Angiotensin Receptor Blocker Protocol Failed - 4/6/2021  2:54 PM        Failed - PCP or prescribing provider visit in past 12 months       Last office visit with prescriber/PCP: 2/24/2020 Isidra Marte FNP OR same dept: Visit date not found OR same specialty: 2/24/2020 Isidra Marte FNP  Last physical: Visit date not found Last MTM visit: Visit date not found   Next visit within 3 mo: Visit date not found  Next physical within 3 mo: Visit date not found  Prescriber OR PCP: DARLIN Lees  Last diagnosis associated with med order: 1. Essential hypertension  - hydroCHLOROthiazide (HYDRODIURIL) 25 MG tablet [Pharmacy Med Name: hydroCHLOROthiazide Oral Tablet 25 MG]; TAKE ONE TABLET BY MOUTH ONE TIME DAILY   Dispense: 90 tablet; Refill: 0  - losartan (COZAAR) 100 MG tablet [Pharmacy Med Name: Losartan Potassium Oral Tablet 100 MG]; TAKE ONE TABLET BY MOUTH ONE TIME DAILY   Dispense: 90 tablet; Refill: 0    If protocol passes may refill for 12 months if within 3 months of last provider visit (or a total of 15 months).             Failed - Serum potassium within the past 12 months     No results found for: LN-POTASSIUM          Failed - Blood pressure filed in past 12 months     BP Readings from Last 1 Encounters:   03/03/20 126/68             Failed - Serum creatinine within the past 12 months     Creatinine   Date Value Ref Range Status   02/24/2020 0.94 0.60 - 1.10 mg/dL Final

## 2021-06-17 NOTE — PATIENT INSTRUCTIONS - HE
"Patient Instructions by Miracle Chapa MD at 1/16/2020  7:10 AM     Author: Miracle Chapa MD Service: -- Author Type: Physician    Filed: 1/16/2020  7:49 AM Encounter Date: 1/16/2020 Status: Signed    : Miracle Chapa MD (Physician)         Patient Education     Bladder Infection, Female (Adult)    Urine is normally doesn't have any bacteria in it. But bacteria can get into the urinary tract from the skin around the rectum. Or they can travel in the blood from elsewhere in the body. Once they are in your urinary tract, they can cause infection in the urethra (urethritis), the bladder (cystitis), or the kidneys (pyelonephritis).  The most common place for an infection is in the bladder. This is called a bladder infection. This is one of the most common infections in women. Most bladder infections are easily treated. They are not serious unless the infection spreads to the kidney.  The phrases \"bladder infection,\" \"UTI,\" and \"cystitis\" are often used to describe the same thing. But they are not always the same. Cystitis is an inflammation of the bladder. The most common cause of cystitis is an infection.  Symptoms  The infection causes inflammation in the urethra and bladder. This causes many of the symptoms. The most common symptoms of a bladder infection are:    Pain or burning when urinating    Having to urinate more often than usual    Urgent need to urinate    Only a small amount of urine comes out    Blood in urine    Abdominal discomfort. This is usually in the lower abdomen above the pubic bone.    Cloudy urine    Strong- or bad-smelling urine    Unable to urinate (urinary retention)    Unable to hold urine in (urinary incontinence)    Fever    Loss of appetite    Confusion (in older adults)  Causes  Bladder infections are not contagious. You can't get one from someone else, from a toilet seat, or from sharing a bath.  The most common cause of bladder infections is bacteria from the " bowels. The bacteria get onto the skin around the opening of the urethra. From there, they can get into the urine and travel up to the bladder, causing inflammation and infection. This usually happens because of:    Wiping improperly after urinating. Always wipe from front to back.    Bowel incontinence    Pregnancy    Procedures such as having a catheter inserted    Older age    Not emptying your bladder. This can allow bacteria a chance to grow in your urine.    Dehydration    Constipation    Sex    Use of a diaphragm for birth control   Treatment  Bladder infections are diagnosed by a urine test. They are treated with antibiotics and usually clear up quickly without complications. Treatment helps prevent a more serious kidney infection.  Medicines  Medicines can help in the treatment of a bladder infection:    Take antibiotics until they are used up, even if you feel better. It is important to finish them to make sure the infection has cleared.    You can use acetaminophen or ibuprofen for pain, fever, or discomfort, unless another medicine was prescribed. If you have chronic liver or kidney disease, talk with your healthcare provider before using these medicines. Also talk with your provider if you've ever had a stomach ulcer or gastrointestinal bleeding, or are taking blood-thinner medicines.    If you are given phenazopydridine to reduce burning with urination, it will cause your urine to become a bright orange color. This can stain clothing.  Care and prevention  These self-care steps can help prevent future infections:    Drink plenty of fluids to prevent dehydration and flush out your bladder. Do this unless you must restrict fluids for other health reasons, or your doctor told you not to.    Proper cleaning after going to the bathroom is important. Wipe from front to back after using the toilet to prevent the spread of bacteria.    Urinate more often. Don't try to hold urine in for a long time.    Wear  loose-fitting clothes and cotton underwear. Avoid tight-fitting pants.    Improve your diet and prevent constipation. Eat more fresh fruit and vegetables, and fiber, and less junk and fatty foods.    Avoid sex until your symptoms are gone.    Avoid caffeine, alcohol, and spicy foods. These can irritate your bladder.    Urinate right after intercourse to flush out your bladder.    If you use birth control pills and have frequent bladder infections, discuss it with your doctor.  Follow-up care  Call your healthcare provider if all symptoms are not gone after 3 days of treatment. This is especially important if you have repeat infections.  If a culture was done, you will be told if your treatment needs to be changed. If directed, you can call to find out the results.  If X-rays were done, you will be told if the results will affect your treatment.  Call 911  Call 911 if any of the following occur:    Trouble breathing    Hard to wake up or confusion    Fainting or loss of consciousness    Rapid heart rate  When to seek medical advice  Call your healthcare provider right away if any of these occur:    Fever of 100.4 F (38.0 C) or higher, or as directed by your healthcare provider    Symptoms are not better by the third day of treatment    Back or belly (abdominal) pain that gets worse    Repeated vomiting, or unable to keep medicine down    Weakness or dizziness    Vaginal discharge    Pain, redness, or swelling in the outer vaginal area (labia)  Date Last Reviewed: 10/1/2016    8497-7313 [employer]. [address] [place] [address], [place] [address]. All rights reserved. This information is not intended as a substitute for professional medical care. Always follow your healthcare professional's instructions.

## 2021-06-17 NOTE — PROGRESS NOTES
Internal Medicine Office Visit  [facility]   Patient Name: Adam Jernigan  Patient Age: 60 y.o.  YOB: 1960  MRN: [id number]    Date of Visit: 4/28/2021  Reason for Office Visit:   Chief Complaint   Patient presents with     Medication Management           Assessment / Plan / Medical Decision Making:    Problem List Items Addressed This Visit     Chronic kidney disease, stage 3     Avoid nsaid pain relievers and encouraged good fluid intake   Repeat BMP          Relevant Medications    hydroCHLOROthiazide (HYDRODIURIL) 25 MG tablet    HTN (hypertension) - Primary     Stable with current medications  Labs today   Encouraged healthy lifestyle          Relevant Medications    hydroCHLOROthiazide (HYDRODIURIL) 25 MG tablet    losartan (COZAAR) 100 MG tablet    Other Relevant Orders    Lipid Profile (Completed)    Basic Metabolic Panel (Completed)    JIC LAV (Completed)        She declines all recommended health maintenance that would be appropriate for her age/gender     I have changed Adam Jernigan's hydroCHLOROthiazide and losartan. I am also having her maintain her fluticasone propionate and cetirizine.                Orders Placed This Encounter   Procedures     Lipid Profile     Basic Metabolic Panel     JIC LAV   Followup: Return in about 1 year (around 4/28/2022) for Next scheduled follow up. earlier if needed.        Isidra Marte, CNP        HPI:  Adam Jernigan is a 60 y.o. year old who presents to the office today for follow up of HTN for medication refills. She denies concerns. No chest pain or dyspnea.     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     She declines all preventive screening tests/immunizations. She states that she feels healthy and doesn't feel that these tests are necessary.     Review of Systems- pertinent positive in bold:  Constitutional: Fever, chills, night sweats, fainting, weight change, fatigue, seizures, dizziness, sleeping difficulties,  loud snoring/pauses in breathing  Eyes: change in vision, blurred or double vision, redness/eye pain  Ears, nose, mouth, throat: change in hearing, ear pain, hoarseness, difficulty swallowing, sores in the mouth or throat  Respiratory: shortness of breath, cough, bloody sputum, wheezing  Cardiovascular: chest pain, palpitations   Gastrointestinal: abdominal pain, heartburn/indigestion, nausea/vomiting, change in appetite, change in bowel habits, constipation or diarrhea, rectal bleeding/dark stools, difficulty swallowing  Urinary: painful urination, frequent urination, urinary urgency/incontinence, blood in urine/dark urine, nocturia  Musculoskeletal: backache/back pain (new or increasing), weakness, joint pain/stiffness (new or increasing), muscle cramps, swelling of hands, feet, ankles, leg pain/redness  Skin: change in moles/freckles, rash, nodules  Hematologic/lymphatic: swollen lymph glands, abnormal bruising/bleeding  Endocrine: excessive thirst/urination, cold or heat intolerance  Breast: breast lump, breast pain, nipple discharge/skin changes  Neurologic/emotional: worrisome memory change, numbness/tingling, anxiety, mood swings      Health Maintenance Review  Health Maintenance   Topic Date Due     PREVENTIVE CARE VISIT  Never done     MAMMOGRAM  Never done     COVID-19 Vaccine (1) Never done     ADVANCE CARE PLANNING  Never done     COLORECTAL CANCER SCREENING  Never done     PAP SMEAR  Never done     ZOSTER VACCINES (1 of 2) Never done     INFLUENZA VACCINE RULE BASED (1) 08/01/2020     TD 18+ HE  08/17/2022     LIPID  04/28/2026     TDAP ADULT ONE TIME DOSE  Completed     Pneumococcal Vaccine: Pediatrics (0 to 5 Years) and At-Risk Patients (6 to 64 Years)  Aged Out     HEPATITIS B VACCINES  Aged Out     HEPATITIS C SCREENING  Discontinued     HIV SCREENING  Discontinued       Current Scheduled Meds:  Outpatient Encounter Medications as of 4/28/2021   Medication Sig Dispense Refill     cetirizine (ZYRTEC)  "10 MG tablet Take 10 mg by mouth.       fluticasone propionate (FLONASE) 50 mcg/actuation nasal spray 1 spray into each nostril.       hydroCHLOROthiazide (HYDRODIURIL) 25 MG tablet Take 1 tablet (25 mg total) by mouth daily. 90 tablet 3     losartan (COZAAR) 100 MG tablet Take 1 tablet (100 mg total) by mouth daily. 90 tablet 3     [DISCONTINUED] hydroCHLOROthiazide (HYDRODIURIL) 25 MG tablet Take 1 tablet (25 mg total) by mouth daily. Needs appointment 14 tablet 0     [DISCONTINUED] losartan (COZAAR) 100 MG tablet Take 1 tablet (100 mg total) by mouth daily. Needs appointment 14 tablet 0     No facility-administered encounter medications on file as of 4/28/2021.            Objective / Physical Examination:  Vitals:    04/28/21 1723   BP: 126/72   Weight: 117 lb (53.1 kg)   Height: 4' 9.5\" (1.461 m)     Wt Readings from Last 3 Encounters:   04/28/21 117 lb (53.1 kg)   02/24/20 117 lb (53.1 kg)   01/16/20 111 lb 12.8 oz (50.7 kg)     Body mass index is 24.88 kg/m .     Constitutional: In no apparent distress  Respiratory: Clear to auscultation bilaterally. Normal inspiratory and expiratory effort  Cardiovascular: Regular rate and rhythm. No murmurs, rubs, or gallops. No edema. No carotid bruits.       "

## 2021-06-17 NOTE — PATIENT INSTRUCTIONS - HE
Patient Instructions by Isidra Marte FNP at 1/24/2019  1:15 PM     Author: Isidra Marte FNP Service: -- Author Type: Nurse Practitioner    Filed: 1/24/2019  1:31 PM Encounter Date: 1/24/2019 Status: Signed    : Isidra Marte FNP (Nurse Practitioner)       Patient Education     Atrophic Vaginitis    Atrophic vaginitis means the walls of your vagina have become thin. This happens when your body makes too little of the hormone estrogen. Menopause or surgical removal of the ovaries are the most common causes for a drop in estrogen. Breastfeeding can also cause the hormone level to drop.  Symptoms of atrophic vaginitis include:    Dryness, soreness, burning, or itching in the vagina    Vaginal discharge  Sex can be uncomfortable, even painful. After sex, you may have bleeding from your vagina. You may also have burning or pain when you urinate.  Home care  Your healthcare provider may recommend 1 or more of these as treatment:    Vaginal creams, lotions, and lubricants. These products help relieve vaginal dryness. They dont need a prescription. They can be found in the personal care section of most pharmacies. Creams and lotions are used daily to help keep the vagina moist. Lubricants help reduce dryness and pain during sex. Choose water-based lubricants. Dont use petroleum jelly, mineral oil, or other oils. These increase the chance of infection.     Hormone therapy (HT). HT increases the amount of estrogen in your body. This can help manage or relieve symptoms. HT can be given in pill form. It may be given as a lotion, cream, ring put into the vagina, or a patch on the skin. The risks and benefits of HT vary for each woman. For instance, your risk may be higher if you have had breast cancer. Discuss this treatment with your healthcare provider. Not every woman can use HT.  You dont need to give up (abstain from) sex. In fact, regular sex can help keep vaginal tissues healthy. Take steps to  make sex more comfortable by using water-based lubricants.  Preventing infections  Atrophic vaginitis makes an infection of the vagina or the urinary tract more likely. To help reduce your risk:    Keep your genitals clean. When you bathe, wash the outside of your vagina with mild soap and water. Clean gently between the folds of your vagina.    Wipe from front to back after a bowel movement.    Dont douche unless your healthcare provider tells you to.    Avoid scented toilet paper, scented vaginal sprays, and scented tampons.    Avoid wearing clothes that are tight in the crotch. These include pantyhose, jeans, and leggings. Wear cotton underwear. Change it every day.  Follow-up care  Follow up with your healthcare provider, or as advised.  When to seek medical advice  Call your health care provider right away if any of these occur:    Fever of 100.4 F (38 C) or higher, or as directed by your healthcare provider    Symptoms dont go away or get worse even with treatment    Vaginal area swells or becomes painful    Vaginal area bleeds, but not because of your period    Bad-smelling discharge from the vagina    Pain or burning when you urinate or you have trouble passing urine    Open sores develop around vagina   Date Last Reviewed: 12/1/2016 2000-2017 [employer]. [address] [place] [address], [place] [address]. All rights reserved. This information is not intended as a substitute for professional medical care. Always follow your healthcare professional's instructions.    Try an over-the-counter lubricant such as [place] Jelly

## 2021-06-17 NOTE — PATIENT INSTRUCTIONS - HE
- Repeat potassium level today. If it is normal, add extra potassium in your diet. If it is still low, I will have you lower your dose of hydrochlorothiazide to 1/2 tablet daily and then return to the office in 2 weeks for a repeat potassium level and blood pressure check   -   What is Chronic Kidney Disease?    Chronic kidney disease (CKD) is the permanent loss of kidney function.  When the kidneys are damaged, they do not remove wastes and extra water from the blood as well as they should.  The most common causes of CKD are high blood pressure and diabetes.  It can also run in families, so you may be at higher risk if you have a blood relative with kidney failure.     CKD is a silent condition (having few or no symptoms) and develops so slowly that most people do not realize they are sick until the disease is advanced.  Left undetected and untreated CKD can eventually lead to further problems including hypertension, anemia, weak bones, poor nutrition, nerve damage, and in severe cases kidney failure (requiring dialysis or transplant).  CKD also increases a patient's risk for developing diseases of the heart and blood vessels.     We can diagnose CKD through blood and urine tests.  By detecting CKD in its early stages we can prevent or delay the complications of CKD, including kidney failure and heart disease.     The severity of your kidney disease and the follow up care it requires are based on two factors:      glomerular filtration rate (GFR)  o represents your level of kidney function  o typically assessed using a blood test    the amount of albumin (a protein) that is present in your urine  o represents the degree of your kidney damage  o typically assessed from a small amount of urine collected at the time of your appointment    Things you can do to slow down or avoid kidney failure:     If you have diabetes, control your blood sugar. Studies show that keeping tight control of blood sugar can delay or  prevent kidney failure     If you have high blood pressure, it is important to lower your blood pressure. Medications called ACE (angiotensin-converting enzyme) inhibitors or ARBs (angiotensin receptor blockers) are used to keep your kidney disease from getting worse.     Be active by including exercise in your daily routine.     Eat a well balanced diet. If your CKD is advanced, we may have you watch the amount of protein you eat. Too much protein can make the kidneys work too hard.     Quit smoking. Smoking damages the kidneys. It also raises blood pressure.     Discuss all of your medications, including over-the-counter medications, with your doctor. You should avoid certain medications, including popular medications such as Advil, Motrin, Aleve (and other  NSAIDs ) which can further damage your kidneys.     For more information on Chronic Kidney Disease, please see the National Kidney Foundation.  www.kidney.org

## 2021-06-17 NOTE — PROGRESS NOTES
Internal Medicine Office Visit  [facility]   Patient Name: Adam Jernigan  Patient Age: 60 y.o.  YOB: 1960  MRN: [id number]    Date of Visit: 5/17/2021  Reason for Office Visit:   Chief Complaint   Patient presents with     Medication Management           Assessment / Plan / Medical Decision Making:    Problem List Items Addressed This Visit     Chronic kidney disease, stage 3     Diagnosis was reviewed with patient and her daughter. She was given handouts regarding this diagnosis. We reviewed the importance of avoidance of NSAIDs, good hydration, good blood pressure control, regular exercise, and healthy diet         Relevant Orders    Basic Metabolic Panel    HTN (hypertension)     Well-controlled with current regimen. She will follow a high potassium diet and if she still has low potassium, we'll consider lowering the dose of hydrochlorothiazide to 12.5 mg daily           Other Visit Diagnoses     Hypokalemia    -  Primary    Relevant Orders    Basic Metabolic Panel         She again declines healthcare screening and immunizations that would be appropriate for her age and gender    I am having Adam Jernigan maintain her fluticasone propionate, cetirizine, hydroCHLOROthiazide, and losartan.            Orders Placed This Encounter   Procedures     Basic Metabolic Panel   Followup: Return in about 2 weeks (around 5/31/2021) for lab visit . earlier if needed.        Isidra Marte CNP        HPI:  Adam Jernigan is a 60 y.o. year old who presents to the office today with her daughter for follow-up in several questions today.    She has a history of hypertension and has been taking losartan and hydrochlorothiazide for several years now. Her most recent labs showed hypokalemia and she was advised to follow a high potassium diet. She has not yet started this. She wants to know if she should completely switch her medication regimen at this point.    We also reviewed diagnosis of chronic kidney  disease stage III. She is reassured that this dates back to 2009 and is very stable. She has several questions related to this diagnosis.        Health Maintenance Review  Health Maintenance   Topic Date Due     PREVENTIVE CARE VISIT  Never done     MAMMOGRAM  Never done     COVID-19 Vaccine (1) Never done     ADVANCE CARE PLANNING  Never done     COLORECTAL CANCER SCREENING  Never done     PAP SMEAR  Never done     ZOSTER VACCINES (1 of 2) Never done     INFLUENZA VACCINE RULE BASED (Season Ended) 08/01/2021     TD 18+ HE  08/17/2022     LIPID  04/28/2026     TDAP ADULT ONE TIME DOSE  Completed     Pneumococcal Vaccine: Pediatrics (0 to 5 Years) and At-Risk Patients (6 to 64 Years)  Aged Out     HEPATITIS B VACCINES  Aged Out     HEPATITIS C SCREENING  Discontinued     HIV SCREENING  Discontinued       Current Scheduled Meds:  Outpatient Encounter Medications as of 5/17/2021   Medication Sig Dispense Refill     cetirizine (ZYRTEC) 10 MG tablet Take 10 mg by mouth.       fluticasone propionate (FLONASE) 50 mcg/actuation nasal spray 1 spray into each nostril.       hydroCHLOROthiazide (HYDRODIURIL) 25 MG tablet Take 1 tablet (25 mg total) by mouth daily. 90 tablet 3     losartan (COZAAR) 100 MG tablet Take 1 tablet (100 mg total) by mouth daily. 90 tablet 3     No facility-administered encounter medications on file as of 5/17/2021.          Objective / Physical Examination:  Vitals:    05/17/21 1701   BP: 130/68   Pulse: 82   Weight: 115 lb (52.2 kg)     Wt Readings from Last 3 Encounters:   05/17/21 115 lb (52.2 kg)   04/28/21 117 lb (53.1 kg)   02/24/20 117 lb (53.1 kg)     Body mass index is 24.45 kg/m .     Constitutional: In no apparent distress

## 2021-06-18 NOTE — LETTER
Letter by Isidra Marte FNP at      Author: Isidra Marte FNP Service: -- Author Type: --    Filed:  Encounter Date: 1/25/2019 Status: (Other)       Adam Jernigan  [address] [facility] [id number]             January 25, 2019         Dear Ms. Jernigan,    Below are the results from your recent visit:    Resulted Orders   Basic Metabolic Panel   Result Value Ref Range    Sodium 142 136 - 145 mmol/L    Potassium 3.6 3.5 - 5.0 mmol/L    Chloride 102 98 - 107 mmol/L    CO2 29 22 - 31 mmol/L    Anion Gap, Calculation 11 5 - 18 mmol/L    Glucose 83 70 - 125 mg/dL    Calcium 9.7 8.5 - 10.5 mg/dL    BUN 17 8 - 22 mg/dL    Creatinine 1.18 (H) 0.60 - 1.10 mg/dL    GFR MDRD Af Amer 57 (L) >60 mL/min/1.73m2    GFR MDRD Non Af Amer 47 (L) >60 mL/min/1.73m2    Narrative    Fasting Glucose reference range is 70-99 mg/dL per  American Diabetes Association (ADA) guidelines.   Urinalysis-UC if Indicated   Result Value Ref Range    Color, UA Yellow Colorless, Yellow, Straw, Light Yellow    Clarity, UA Clear Clear    Glucose, UA Negative Negative    Bilirubin, UA Negative Negative    Ketones, UA Negative Negative    Specific Gravity, UA 1.010 1.005 - 1.030    Blood, UA Negative Negative    pH, UA 5.5 5.0 - 8.0    Protein, UA Negative Negative mg/dL    Urobilinogen, UA 0.2 E.U./dL 0.2 E.U./dL, 1.0 E.U./dL    Nitrite, UA Negative Negative    Leukocytes, UA Negative Negative    Narrative    Microscopic not indicated  UC not indicated       The results of your urine test are entirely normal, there is no excess protein or signs of infection in your urine test results. The other blood test does show changes in your kidney function. Sometimes this will occur if you are dehydrated (not getting enough water to drink) or taking too many NSAID medications such as ibuprofen, aleve, naproxen.     My advise is to drink plenty of fluids and then return in 1 month for a lab appointment only to recheck your kidney function. I will let you know  the results of this lab test.     Please call with questions or contact us using [employer].    Sincerely,        Electronically signed by DARLIN Lees

## 2021-06-20 NOTE — LETTER
Letter by Isidra Marte FNP at      Author: Isidra Marte FNP Service: -- Author Type: --    Filed:  Encounter Date: 2/25/2020 Status: (Other)         Adam Jernigan  [address] [facility] [address]             February 25, 2020         Dear Ms. Jernigan,    Below are the results from your recent visit:    Resulted Orders   Basic Metabolic Panel   Result Value Ref Range    Sodium 141 136 - 145 mmol/L    Potassium 3.4 (L) 3.5 - 5.0 mmol/L    Chloride 102 98 - 107 mmol/L    CO2 29 22 - 31 mmol/L    Anion Gap, Calculation 10 5 - 18 mmol/L    Glucose 81 70 - 125 mg/dL    Calcium 9.1 8.5 - 10.5 mg/dL    BUN 22 8 - 22 mg/dL    Creatinine 0.94 0.60 - 1.10 mg/dL    GFR MDRD Af Amer >60 >60 mL/min/1.73m2    GFR MDRD Non Af Amer >60 >60 mL/min/1.73m2    Narrative    Fasting Glucose reference range is 70-99 mg/dL per  American Diabetes Association (ADA) guidelines.   Lipid Profile   Result Value Ref Range    Triglycerides 84 <=149 mg/dL    Cholesterol 224 (H) <=199 mg/dL    LDL Calculated 145 (H) <=129 mg/dL    HDL Cholesterol 62 >=50 mg/dL    Patient Fasting > 8hrs? Unknown      Your cholesterol result are elevated. When I calculate your 10-year risk of heart disease this is considered moderate. I recommend regular exercise of at least 30 minutes of activity 5 days of the week.     Your potassium level is low. I included a handout of high potassium foods. Since this is borderline low, I recommend eating some of these high potassium foods every day and we will recheck your potassium with your next appointment. If it is still low, I could prescribe a potassium supplement instead.     Please call with questions or contact us using [employer].    Sincerely,        Electronically signed by DARLIN Lees

## 2021-06-21 NOTE — LETTER
Letter by Isidra Marte FNP at      Author: Isidra Marte FNP Service: -- Author Type: --    Filed:  Encounter Date: 5/3/2021 Status: (Other)         Adam Jernigan  [address] [facility] [address]             May 3, 2021         Dear Ms. Jernigan,    Below are the results from your recent visit:    Resulted Orders   Lipid Profile   Result Value Ref Range    Triglycerides 359 (H) <=149 mg/dL    Cholesterol 183 <=199 mg/dL    LDL Calculated 69 <=129 mg/dL    HDL Cholesterol 42 (L) >=50 mg/dL    Patient Fasting > 8hrs? Yes    Basic Metabolic Panel   Result Value Ref Range    Sodium 142 136 - 145 mmol/L    Potassium 3.2 (L) 3.5 - 5.0 mmol/L    Chloride 105 98 - 107 mmol/L    CO2 27 22 - 31 mmol/L    Anion Gap, Calculation 10 5 - 18 mmol/L    Glucose 84 70 - 125 mg/dL    Calcium 9.0 8.5 - 10.5 mg/dL    BUN 26 (H) 8 - 22 mg/dL    Creatinine 1.20 (H) 0.60 - 1.10 mg/dL    GFR MDRD Af Amer 56 (L) >60 mL/min/1.73m2    GFR MDRD Non Af Amer 46 (L) >60 mL/min/1.73m2    Narrative    Fasting Glucose reference range is 70-99 mg/dL per  American Diabetes Association (ADA) guidelines.     Your cholesterol numbers look excellent.  These are much better than they were last year.  Triglycerides are still elevated which are type of blood fat.  Regular exercise can help with this along with a low-fat low sugar and plant-based diet.    You have stage III chronic kidney disease.  I also note that you have low potassium.  This may be due to the hydrochlorothiazide medication.  I recommend that you follow a high potassium diet and have sent a handout that includes high potassium foods.  We should repeat your potassium level in another 1-2 months after following a high potassium diet.  If this is still low, I will have you add a potassium supplement.  It is really important that we follow-up on this to make sure that your potassium levels do not become too low.  Please call the clinic to schedule a potassium lab test, the order is  already on your chart.    Please call with questions or contact us using [employer].    Sincerely,        Electronically signed by DARLIN Lees

## 2021-06-21 NOTE — PROGRESS NOTES
Internal Medicine Office Visit  [facility]  Patient Name: Adam Jernigan  Patient Age: 57 y.o.  YOB: 1960  MRN: [id number]    Date of Visit: 10/24/2018  Reason for Office Visit:   Chief Complaint   Patient presents with     Medication Management           Assessment / Plan / Medical Decision Makin. HTN (hypertension)  - Stable   - Basic Metabolic Panel; Future  - hydroCHLOROthiazide (MICROZIDE) 12.5 mg capsule; Take 1 capsule (12.5 mg total) by mouth daily.  Dispense: 90 capsule; Refill: 1  - losartan (COZAAR) 100 MG tablet; Take 1 tablet (100 mg total) by mouth daily.  Dispense: 90 tablet; Refill: 1  - Basic Metabolic Panel    2. Vision changes  - Advised follow up eye appointment for reported vision changes     Breast cancer cancer and colon cancer screening recommended, she declines these screening tests.    Health Maintenance Review  Health Maintenance   Topic Date Due     ADVANCE DIRECTIVES DISCUSSED WITH PATIENT  1978     PAP SMEAR  1990     COLONOSCOPY  2010     INFLUENZA VACCINE RULE BASED (1) 2018     MAMMOGRAM  2020 (Originally 1960)     TD 18+ HE  2022     TDAP ADULT ONE TIME DOSE  Completed         I have discontinued Ms. Jernigan's polyethylene glycol. I have also changed her losartan. Additionally, I am having her maintain her hydroCHLOROthiazide.      HPI:  Adam Jernigan is a 57 y.o. year old who presents to the office today for follow-up.She is here with her daughter who occasionally translates but she generally speaks very good English.     She has had some vision changes in the right eye, she describes seeing something like a black sparkle occasionally in her field of vision. No blurred/double vision.     She has been more tired recently.  Her son had to have a tonsillectomy and had complications which required him to have a tracheostomy.  This is been reversed and he is doing well.  She has not started to sleep  better.    Hypertension was reviewed.  She is not experiencing any lightheadedness or dizziness associated with her treatment.    Review of Systems- pertinent positive in bold:  Constitutional: Fever, chills, night sweats, fainting, weight change, fatigue, seizures, dizziness, sleeping difficulties, loud snoring/pauses in breathing  Eyes: change in vision, blurred or double vision, redness/eye pain  Ears, nose, mouth, throat: change in hearing, ear pain, hoarseness, difficulty swallowing, sores in the mouth or throat  Respiratory: shortness of breath, cough, bloody sputum, wheezing  Cardiovascular: chest pain, palpitations   Gastrointestinal: abdominal pain, heartburn/indigestion, nausea/vomiting, change in appetite, change in bowel habits, constipation or diarrhea, rectal bleeding/dark stools, difficulty swallowing  Urinary: painful urination, frequent urination, urinary urgency/incontinence, blood in urine/dark urine, nocturia  Genital: WOMEN: vaginal discharge or odor, bleeding/pain with intercourse, pelvic pain, vulvar/vaginal itching or burning, excessive menstrual bleeding, problems with sexual function  Musculoskeletal: backache/back pain (new or increasing), weakness, joint pain/stiffness (new or increasing), muscle cramps, swelling of hands, feet, ankles, leg pain/redness  Skin: change in moles/freckles, rash, nodules  Hematologic/lymphatic: swollen lymph glands, abnormal bruising/bleeding  Endocrine: excessive thirst/urination, cold or heat intolerance  Breast: breast lump, breast pain, nipple discharge/skin changes  Neurologic/emotional: worrisome memory change, numbness/tingling, anxiety, mood swings      Current Scheduled Meds:  Outpatient Encounter Prescriptions as of 10/24/2018   Medication Sig Dispense Refill     hydroCHLOROthiazide (MICROZIDE) 12.5 mg capsule Take 1 capsule (12.5 mg total) by mouth daily. 90 capsule 1     losartan (COZAAR) 100 MG tablet Take 1 tablet (100 mg total) by mouth daily.  90 tablet 1     [DISCONTINUED] hydroCHLOROthiazide (MICROZIDE) 12.5 mg capsule Take 1 capsule (12.5 mg total) by mouth daily. 90 capsule 3     [DISCONTINUED] losartan (COZAAR) 100 MG tablet TAKE ONE TABLET BY MOUTH ONE TIME DAILY  90 tablet 0     [DISCONTINUED] polyethylene glycol (GLYCOLAX) 17 gram/dose powder Take 17 g by mouth daily. 510 g 0     No facility-administered encounter medications on file as of 10/24/2018.      Past Medical History:   Diagnosis Date     HLD (hyperlipidemia)      HTN (hypertension)      No past surgical history on file.  Social History   Substance Use Topics     Smoking status: Never Smoker     Smokeless tobacco: Never Used     Alcohol use No       Objective / Physical Examination:  Vitals:    10/24/18 1502   BP: 120/70   Pulse: 70   Weight: 114 lb (51.7 kg)     Wt Readings from Last 3 Encounters:   10/24/18 114 lb (51.7 kg)   08/31/17 119 lb (54 kg)   04/17/17 117 lb 9.6 oz (53.3 kg)     Body mass index is 24.24 kg/(m^2).     General Appearance: Alert and oriented, cooperative, affect appropriate, speech clear, in no apparent distress  Neck: Supple, trachea midline. No carotid bruits   Lungs: Clear to auscultation bilaterally. Normal inspiratory and expiratory effort  Cardiovascular: Regular rate, normal S1, S2. No murmurs, rubs, or gallops  Extremities: No edema    Orders Placed This Encounter   Procedures     Basic Metabolic Panel   Followup: Return in about 6 months (around 4/24/2019) for Next scheduled follow up. earlier if needed.        Isidra Marte, CNP

## 2021-06-23 NOTE — PROGRESS NOTES
"Internal Medicine Office Visit  [facility]  Patient Name: Adam Jernigan  Patient Age: 58 y.o.  YOB: 1960  MRN: [id number]    Date of Visit: 2019  Reason for Office Visit:   Chief Complaint   Patient presents with     Medication Management     Pt here today to discuss possible side effects from Losartan- states urine is clear and \"bubbly\", discuss recall letter from pharmacy           Assessment / Plan / Medical Decision Makin. Essential hypertension  2. Cloudy urine  - No change to current medications   - hydroCHLOROthiazide (MICROZIDE) 12.5 mg capsule; Take 1 capsule (12.5 mg total) by mouth daily.  Dispense: 90 capsule; Refill: 1  - losartan (COZAAR) 100 MG tablet; Take 1 tablet (100 mg total) by mouth daily.  Dispense: 90 tablet; Refill: 1  - Labs as below to evaluate for patient concern of foam/froth in urine   - Basic Metabolic Panel  - Urinalysis-UC if Indicated    3. Vaginal atrophy  - Advised OTC water based lubricant. Handout given  - Follow up if not effective, could consider topical estrogen cream         Health Maintenance Review  Health Maintenance   Topic Date Due     ADVANCE DIRECTIVES DISCUSSED WITH PATIENT  1978     INFLUENZA VACCINE RULE BASED (1) 2018     PAP SMEAR  2019 (Originally 1990)     COLONOSCOPY  2019 (Originally 2010)     MAMMOGRAM  2020 (Originally 1960)     TD 18+ HE  2022     TDAP ADULT ONE TIME DOSE  Completed         I am having Adam Jernigan maintain her hydroCHLOROthiazide and losartan.      HPI:  Adam Jernigan is a 58 y.o. year old who presents to the office today with her daughter.     For the past few months her urine appears bubbly. No changes in color, smell. No dysuria.    Blood pressure reviewed, her reading is high today. She states that she was rushing from work.     She has a new concern today also of vaginal dryness and discomfort with intercourse. She has not tried " any interventions.     Review of Systems- pertinent positive in bold:  A 10-point ROS is negative except as stated in HPI         Current Scheduled Meds:  Outpatient Encounter Medications as of 1/24/2019   Medication Sig Dispense Refill     hydroCHLOROthiazide (MICROZIDE) 12.5 mg capsule Take 1 capsule (12.5 mg total) by mouth daily. 90 capsule 1     losartan (COZAAR) 100 MG tablet Take 1 tablet (100 mg total) by mouth daily. 90 tablet 1     [DISCONTINUED] hydroCHLOROthiazide (MICROZIDE) 12.5 mg capsule Take 1 capsule (12.5 mg total) by mouth daily. 90 capsule 1     [DISCONTINUED] losartan (COZAAR) 100 MG tablet Take 1 tablet (100 mg total) by mouth daily. 90 tablet 1     No facility-administered encounter medications on file as of 1/24/2019.      Past Medical History:   Diagnosis Date     HLD (hyperlipidemia)      HTN (hypertension)      TIA (transient ischemic attack)      History reviewed. No pertinent surgical history.  Social History     Tobacco Use     Smoking status: Never Smoker     Smokeless tobacco: Never Used   Substance Use Topics     Alcohol use: No     Drug use: No       Objective / Physical Examination:  Vitals:    01/24/19 1305 01/24/19 1334   BP: 156/90 130/80   Patient Site: Right Arm    Patient Position: Sitting    Cuff Size: Adult Regular    Pulse: (!) 52    Resp: 16    Temp: 97.8  F (36.6  C)    TempSrc: Oral    Weight: 115 lb 1.6 oz (52.2 kg)      Wt Readings from Last 3 Encounters:   01/24/19 115 lb 1.6 oz (52.2 kg)   10/24/18 114 lb (51.7 kg)   08/31/17 119 lb (54 kg)     Body mass index is 24.48 kg/m .     General Appearance: Alert and oriented, cooperative, affect appropriate, speech clear, in no apparent distress  Back: No CVA tenderness   Lungs: Clear to auscultation bilaterally. Normal inspiratory and expiratory effort  Cardiovascular: Regular rate, normal S1, S2. No murmurs, rubs, or gallops  Abdomen: Bowel sounds active all four quadrants. Soft, non-tender  Extremities: No  edema    Orders Placed This Encounter   Procedures     Basic Metabolic Panel     Urinalysis-UC if Indicated   Followup: Return in about 6 months (around 7/24/2019) for Next scheduled follow up. earlier if needed.        Isidra Marte, CNP

## 2021-07-03 NOTE — ADDENDUM NOTE
Addendum Note by Isidra Marte FNP at 3/3/2020  2:00 PM     Author: Isidra Marte FNP Service: -- Author Type: Nurse Practitioner    Filed: 3/3/2020  3:22 PM Encounter Date: 3/3/2020 Status: Signed    : Isidra Marte FNP (Nurse Practitioner)    Addended by: ISIDRA MARTE on: 3/3/2020 03:22 PM        Modules accepted: Orders

## 2021-07-04 NOTE — ADDENDUM NOTE
Addendum Note by Isidra Marte FNP at 4/28/2021  5:25 PM     Author: Isidra Marte FNP Service: -- Author Type: Nurse Practitioner    Filed: 4/30/2021 10:52 AM Encounter Date: 4/28/2021 Status: Signed    : Isidra Marte FNP (Nurse Practitioner)    Addended by: ISIDRA MARTE on: 4/30/2021 10:52 AM        Modules accepted: Level of Service

## 2021-07-04 NOTE — LETTER
Letter by Isidra Marte FNP at      Author: Isidra Marte FNP Service: -- Author Type: --    Filed:  Encounter Date: 6/7/2021 Status: (Other)         Adam Jernigan  [address] [facility] [address]             June 7, 2021         Dear Ms. Jernigan,    Below are the results from your recent visit:    Resulted Orders   Basic Metabolic Panel   Result Value Ref Range    Sodium 142 136 - 145 mmol/L    Potassium 3.8 3.5 - 5.0 mmol/L    Chloride 103 98 - 107 mmol/L    CO2 26 22 - 31 mmol/L    Anion Gap, Calculation 13 5 - 18 mmol/L    Glucose 76 70 - 125 mg/dL    Calcium 9.1 8.5 - 10.5 mg/dL    BUN 17 8 - 22 mg/dL    Creatinine 1.08 0.60 - 1.10 mg/dL    GFR MDRD Af Amer >60 >60 mL/min/1.73m2    GFR MDRD Non Af Amer 52 (L) >60 mL/min/1.73m2    Narrative    Fasting Glucose reference range is 70-99 mg/dL per  American Diabetes Association (ADA) guidelines.     Kidney function numbers have improved. These still indicate chronic kidney disease.  Electrolytes are normal including potassium levels.     Please call with questions or contact us using [employer].    Sincerely,        Electronically signed by DARLIN Lees

## 2022-01-18 VITALS
WEIGHT: 115 LBS | HEART RATE: 82 BPM | DIASTOLIC BLOOD PRESSURE: 68 MMHG | BODY MASS INDEX: 24.45 KG/M2 | SYSTOLIC BLOOD PRESSURE: 130 MMHG

## 2022-01-18 VITALS
SYSTOLIC BLOOD PRESSURE: 146 MMHG | HEIGHT: 58 IN | WEIGHT: 117 LBS | BODY MASS INDEX: 24.56 KG/M2 | HEART RATE: 64 BPM | DIASTOLIC BLOOD PRESSURE: 90 MMHG

## 2022-01-18 VITALS
SYSTOLIC BLOOD PRESSURE: 126 MMHG | HEIGHT: 58 IN | BODY MASS INDEX: 24.56 KG/M2 | DIASTOLIC BLOOD PRESSURE: 72 MMHG | WEIGHT: 117 LBS

## 2022-01-18 VITALS
DIASTOLIC BLOOD PRESSURE: 84 MMHG | TEMPERATURE: 97.4 F | RESPIRATION RATE: 12 BRPM | SYSTOLIC BLOOD PRESSURE: 150 MMHG | HEART RATE: 67 BPM | BODY MASS INDEX: 23.77 KG/M2 | OXYGEN SATURATION: 98 % | WEIGHT: 111.8 LBS

## 2022-01-18 VITALS — SYSTOLIC BLOOD PRESSURE: 126 MMHG | DIASTOLIC BLOOD PRESSURE: 68 MMHG

## 2022-05-04 DIAGNOSIS — I10 ESSENTIAL HYPERTENSION: ICD-10-CM

## 2022-05-07 RX ORDER — LOSARTAN POTASSIUM 100 MG/1
TABLET ORAL
Qty: 90 TABLET | Refills: 0 | Status: SHIPPED | OUTPATIENT
Start: 2022-05-07 | End: 2022-10-31

## 2022-05-07 RX ORDER — HYDROCHLOROTHIAZIDE 25 MG/1
TABLET ORAL
Qty: 90 TABLET | Refills: 0 | Status: SHIPPED | OUTPATIENT
Start: 2022-05-07 | End: 2022-10-31

## 2022-05-07 NOTE — TELEPHONE ENCOUNTER
"Last Written Prescription Date:  4/28/21  Last Fill Quantity: 90,  # refills: 3   Last office visit provider:  5/17/21     Requested Prescriptions   Pending Prescriptions Disp Refills     hydrochlorothiazide (HYDRODIURIL) 25 MG tablet [Pharmacy Med Name: hydroCHLOROthiazide Oral Tablet 25 MG] 90 tablet 0     Sig: TAKE ONE TABLET BY MOUTH ONE TIME DAILY       Diuretics (Including Combos) Protocol Passed - 5/4/2022  2:00 AM        Passed - Blood pressure under 140/90 in past 12 months     BP Readings from Last 3 Encounters:   05/17/21 130/68   05/17/21 130/68   04/28/21 126/72                 Passed - Recent (12 mo) or future (30 days) visit within the authorizing provider's specialty     Patient has had an office visit with the authorizing provider or a provider within the authorizing providers department within the previous 12 mos or has a future within next 30 days. See \"Patient Info\" tab in inbasket, or \"Choose Columns\" in Meds & Orders section of the refill encounter.              Passed - Medication is active on med list        Passed - Patient is age 18 or older        Passed - No active pregancy on record        Passed - Normal serum creatinine on file in past 12 months     Recent Labs   Lab Test 06/04/21  1531   CR 1.08              Passed - Normal serum potassium on file in past 12 months     Recent Labs   Lab Test 06/04/21  1531   POTASSIUM 3.8                    Passed - Normal serum sodium on file in past 12 months     Recent Labs   Lab Test 06/04/21  1531                 Passed - No positive pregnancy test in past 12 months           losartan (COZAAR) 100 MG tablet [Pharmacy Med Name: Losartan Potassium Oral Tablet 100 MG] 90 tablet 0     Sig: TAKE ONE TABLET BY MOUTH ONE TIME DAILY       Angiotensin-II Receptors Passed - 5/4/2022  2:00 AM        Passed - Last blood pressure under 140/90 in past 12 months     BP Readings from Last 3 Encounters:   05/17/21 130/68   05/17/21 130/68   04/28/21 126/72 " "                Passed - Recent (12 mo) or future (30 days) visit within the authorizing provider's specialty     Patient has had an office visit with the authorizing provider or a provider within the authorizing providers department within the previous 12 mos or has a future within next 30 days. See \"Patient Info\" tab in inbasket, or \"Choose Columns\" in Meds & Orders section of the refill encounter.              Passed - Medication is active on med list        Passed - Patient is age 18 or older        Passed - No active pregnancy on record        Passed - Normal serum creatinine on file in past 12 months     Recent Labs   Lab Test 06/04/21  1531   CR 1.08       Ok to refill medication if creatinine is low          Passed - Normal serum potassium on file in past 12 months     Recent Labs   Lab Test 06/04/21  1531   POTASSIUM 3.8                    Passed - No positive pregnancy test in past 12 months             Nubia Dickey 05/07/22 5:12 PM    "

## 2022-10-31 ENCOUNTER — OFFICE VISIT (OUTPATIENT)
Dept: INTERNAL MEDICINE | Facility: CLINIC | Age: 62
End: 2022-10-31
Payer: COMMERCIAL

## 2022-10-31 VITALS
HEIGHT: 58 IN | RESPIRATION RATE: 16 BRPM | SYSTOLIC BLOOD PRESSURE: 170 MMHG | OXYGEN SATURATION: 98 % | DIASTOLIC BLOOD PRESSURE: 82 MMHG | HEART RATE: 53 BPM | BODY MASS INDEX: 25.17 KG/M2 | WEIGHT: 119.9 LBS

## 2022-10-31 DIAGNOSIS — Z12.11 COLON CANCER SCREENING: ICD-10-CM

## 2022-10-31 DIAGNOSIS — I10 PRIMARY HYPERTENSION: ICD-10-CM

## 2022-10-31 DIAGNOSIS — E78.2 MIXED HYPERLIPIDEMIA: ICD-10-CM

## 2022-10-31 DIAGNOSIS — Z13.220 SCREENING FOR HYPERLIPIDEMIA: ICD-10-CM

## 2022-10-31 DIAGNOSIS — N18.31 STAGE 3A CHRONIC KIDNEY DISEASE (H): Primary | ICD-10-CM

## 2022-10-31 LAB
ANION GAP SERPL CALCULATED.3IONS-SCNC: 10 MMOL/L (ref 7–15)
BUN SERPL-MCNC: 15.8 MG/DL (ref 8–23)
CALCIUM SERPL-MCNC: 9.6 MG/DL (ref 8.8–10.2)
CHLORIDE SERPL-SCNC: 104 MMOL/L (ref 98–107)
CHOLEST SERPL-MCNC: 222 MG/DL
CREAT SERPL-MCNC: 1.04 MG/DL (ref 0.51–0.95)
DEPRECATED HCO3 PLAS-SCNC: 29 MMOL/L (ref 22–29)
GFR SERPL CREATININE-BSD FRML MDRD: 61 ML/MIN/1.73M2
GLUCOSE SERPL-MCNC: 86 MG/DL (ref 70–99)
HDLC SERPL-MCNC: 72 MG/DL
HGB BLD-MCNC: 13.9 G/DL (ref 11.7–15.7)
LDLC SERPL CALC-MCNC: 130 MG/DL
NONHDLC SERPL-MCNC: 150 MG/DL
POTASSIUM SERPL-SCNC: 3.3 MMOL/L (ref 3.4–5.3)
SODIUM SERPL-SCNC: 143 MMOL/L (ref 136–145)
TRIGL SERPL-MCNC: 101 MG/DL

## 2022-10-31 PROCEDURE — 80061 LIPID PANEL: CPT | Performed by: NURSE PRACTITIONER

## 2022-10-31 PROCEDURE — 80048 BASIC METABOLIC PNL TOTAL CA: CPT | Performed by: NURSE PRACTITIONER

## 2022-10-31 PROCEDURE — 99214 OFFICE O/P EST MOD 30 MIN: CPT | Performed by: NURSE PRACTITIONER

## 2022-10-31 PROCEDURE — 82043 UR ALBUMIN QUANTITATIVE: CPT | Performed by: NURSE PRACTITIONER

## 2022-10-31 PROCEDURE — 85018 HEMOGLOBIN: CPT | Performed by: NURSE PRACTITIONER

## 2022-10-31 PROCEDURE — 36415 COLL VENOUS BLD VENIPUNCTURE: CPT | Performed by: NURSE PRACTITIONER

## 2022-10-31 RX ORDER — LOSARTAN POTASSIUM 100 MG/1
100 TABLET ORAL DAILY
Qty: 90 TABLET | Refills: 0 | Status: SHIPPED | OUTPATIENT
Start: 2022-10-31 | End: 2023-01-25

## 2022-10-31 RX ORDER — HYDROCHLOROTHIAZIDE 25 MG/1
25 TABLET ORAL DAILY
Qty: 90 TABLET | Refills: 0 | Status: SHIPPED | OUTPATIENT
Start: 2022-10-31 | End: 2023-01-25

## 2022-10-31 ASSESSMENT — PATIENT HEALTH QUESTIONNAIRE - PHQ9
10. IF YOU CHECKED OFF ANY PROBLEMS, HOW DIFFICULT HAVE THESE PROBLEMS MADE IT FOR YOU TO DO YOUR WORK, TAKE CARE OF THINGS AT HOME, OR GET ALONG WITH OTHER PEOPLE: NOT DIFFICULT AT ALL
SUM OF ALL RESPONSES TO PHQ QUESTIONS 1-9: 16

## 2022-10-31 NOTE — PATIENT INSTRUCTIONS
- Bring your blood pressure cuff with you in about 2-3 weeks for a nurse visit blood pressure check so we can see if it is reading correctly   - Eat the losartan and hydrochlorothiazide medication every single day so that the medication works better to lower your risk of a stroke and heart disease
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
## 2022-10-31 NOTE — LETTER
November 1, 2022      Adam Jernigan  [address] [facility] [address]        Dear ,    We are writing to inform you of your test results.    The urine test shows that you have a small amount of excess protein in the urine.  This may be due to chronic kidney disease related to high blood pressure.  It is very important that you take both hydrochlorothiazide and losartan together every single day to help keep your blood pressure well controlled.    Your hemoglobin is normal, no anemia.    The cholesterol test is high, currently your 10-year risk of heart disease and stroke is considered to be moderate at 7%.  I recommend regular exercise and a plant-based diet to manage your cholesterol at the current time.    Your potassium is mildly low.  This may be related to the hydrochlorothiazide medication.  I recommend that you eat foods high in potassium every day such as potato, banana, strawberries.  You can also look online for other foods that are high in potassium.    Resulted Orders   Albumin Random Urine Quantitative with Creat Ratio   Result Value Ref Range    Albumin Urine mg/L 38.7 mg/L      Comment:      The reference ranges have not been established in urine albumin. The results should be integrated into the clinical context for interpretation.    Albumin Urine mg/g Cr 114.50 (H) 0.00 - 25.00 mg/g Cr      Comment:      Microalbuminuria is defined as an albumin:creatinine ratio of 17 to 299 for males and 25 to 299 for females. A ratio of albumin:creatinine of 300 or higher is indicative of overt proteinuria.  Due to biologic variability, positive results should be confirmed by a second, first-morning random or 24-hour timed urine specimen. If there is discrepancy, a third specimen is recommended. When 2 out of 3 results are in the microalbuminuria range, this is evidence for incipient nephropathy and warrants increased efforts at glucose control, blood pressure control, and institution of therapy with an  angiotensin-converting-enzyme (ACE) inhibitor (if the patient can tolerate it).      Creatinine Urine mg/dL 33.8 mg/dL      Comment:      The reference ranges have not been established in urine creatinine. The results should be integrated into the clinical context for interpretation.   Hemoglobin   Result Value Ref Range    Hemoglobin 13.9 11.7 - 15.7 g/dL   Lipid panel reflex to direct LDL Non-fasting   Result Value Ref Range    Cholesterol 222 (H) <200 mg/dL    Triglycerides 101 <150 mg/dL    Direct Measure HDL 72 >=50 mg/dL    LDL Cholesterol Calculated 130 (H) <=100 mg/dL    Non HDL Cholesterol 150 (H) <130 mg/dL    Narrative    Cholesterol  Desirable:  <200 mg/dL    Triglycerides  Normal:  Less than 150 mg/dL  Borderline High:  150-199 mg/dL  High:  200-499 mg/dL  Very High:  Greater than or equal to 500 mg/dL    Direct Measure HDL  Female:  Greater than or equal to 50 mg/dL   Male:  Greater than or equal to 40 mg/dL    LDL Cholesterol  Desirable:  <100mg/dL  Above Desirable:  100-129 mg/dL   Borderline High:  130-159 mg/dL   High:  160-189 mg/dL   Very High:  >= 190 mg/dL    Non HDL Cholesterol  Desirable:  130 mg/dL  Above Desirable:  130-159 mg/dL  Borderline High:  160-189 mg/dL  High:  190-219 mg/dL  Very High:  Greater than or equal to 220 mg/dL   BASIC METABOLIC PANEL   Result Value Ref Range    Sodium 143 136 - 145 mmol/L    Potassium 3.3 (L) 3.4 - 5.3 mmol/L    Chloride 104 98 - 107 mmol/L    Carbon Dioxide (CO2) 29 22 - 29 mmol/L    Anion Gap 10 7 - 15 mmol/L    Urea Nitrogen 15.8 8.0 - 23.0 mg/dL    Creatinine 1.04 (H) 0.51 - 0.95 mg/dL    Calcium 9.6 8.8 - 10.2 mg/dL    Glucose 86 70 - 99 mg/dL    GFR Estimate 61 >60 mL/min/1.73m2      Comment:      Effective December 21, 2021 eGFRcr in adults is calculated using the 2021 CKD-EPI creatinine equation which includes age and gender (Stella lowe al., NEJM, DOI: 10.1056/ZACRjx2785922)       If you have any questions or concerns, please call the clinic at  the number listed above.       Sincerely,      Isidra Marte NP

## 2022-11-01 LAB
CREAT UR-MCNC: 33.8 MG/DL
MICROALBUMIN UR-MCNC: 38.7 MG/L
MICROALBUMIN/CREAT UR: 114.5 MG/G CR (ref 0–25)

## 2022-11-02 NOTE — ASSESSMENT & PLAN NOTE
We reviewed the risk of worsening chronic kidney disease with high blood pressure.  She is strongly encouraged to continue to take her blood pressure medications.  Regular exercise, good hydration encouraged.

## 2022-11-02 NOTE — ASSESSMENT & PLAN NOTE
She is strongly advised that in order to reduce her risk of stroke and heart disease, she needs to take the blood pressure medication regularly rather than on an as-needed basis.  She is advised that if she develops lightheadedness or dizziness associated with low blood pressure, she should return to the office we can make a dose adjustment to her daily regimen rather than taking it only as needed.  Her blood pressure is very high today, no signs or symptoms of end-organ damage.  She is advised to take her medications when she gets home.  Return for nurse visit blood pressure check in 1 week.

## 2022-11-11 ENCOUNTER — ALLIED HEALTH/NURSE VISIT (OUTPATIENT)
Dept: FAMILY MEDICINE | Facility: CLINIC | Age: 62
End: 2022-11-11
Payer: COMMERCIAL

## 2022-11-11 VITALS — DIASTOLIC BLOOD PRESSURE: 98 MMHG | SYSTOLIC BLOOD PRESSURE: 180 MMHG | HEART RATE: 51 BPM

## 2022-11-11 DIAGNOSIS — Z01.30 BLOOD PRESSURE CHECK: Primary | ICD-10-CM

## 2022-11-11 DIAGNOSIS — I16.0 HYPERTENSIVE URGENCY: Primary | ICD-10-CM

## 2022-11-11 PROCEDURE — 99207 PR NO CHARGE NURSE ONLY: CPT

## 2022-11-11 RX ORDER — AMLODIPINE BESYLATE 5 MG/1
5 TABLET ORAL DAILY
Qty: 90 TABLET | Refills: 0 | Status: SHIPPED | OUTPATIENT
Start: 2022-11-11 | End: 2023-01-25

## 2022-11-11 NOTE — PROGRESS NOTES
I met with Adam Jernigan at the request of Isidra Marte to recheck her blood pressure.  Blood pressure medications on the med list were reviewed with patient.    Patient has taken all medications as per usual regimen: Yes  Patient reports tolerating them without any issues or concerns: Yes    Vitals:    11/11/22 1359 11/11/22 1407 11/11/22 1412   BP: (!) 224/101 (!) 232/104 (!) 180/98   BP Location: Right arm Right arm    Patient Position: Sitting Sitting    Cuff Size: Adult Regular Adult Regular    Pulse: (!) 47 51        After 5 minutes, the patient's blood pressure remained greater than or equal to 140/90.    Is the patient currently having any chest pain? No  Does the patient currently have a headache? No  Does the patient currently have any vision changes? No  Does the patient currently have any nausea? No  Does the patient currently have any abdominal pain? No    The previous encounter was reviewed.  The patient was discharged and the note will be sent to the provider for final review.     PCP out of office. Dr. Dodson informed of patients BP. Amlodipine 5 MG sent in to pharmacy. Dr. Dodson recommends patient back to clinic next week Monday or Tuesday for BP recheck. Patient was informed and will come back next Tuesday 11/15/22 for recheck.

## 2022-11-14 ENCOUNTER — ALLIED HEALTH/NURSE VISIT (OUTPATIENT)
Dept: FAMILY MEDICINE | Facility: CLINIC | Age: 62
End: 2022-11-14
Payer: COMMERCIAL

## 2022-11-14 VITALS — SYSTOLIC BLOOD PRESSURE: 168 MMHG | DIASTOLIC BLOOD PRESSURE: 80 MMHG

## 2022-11-14 DIAGNOSIS — I10 PRIMARY HYPERTENSION: Primary | ICD-10-CM

## 2022-11-14 PROCEDURE — 99207 PR NO CHARGE NURSE ONLY: CPT

## 2022-11-14 NOTE — PROGRESS NOTES
I met with Adam Jernigan at the request of Isidra Marte DNP  to recheck her blood pressure.  Blood pressure medications on the med list were reviewed with patient.    Patient has taken all medications as per usual regimen: Yes  Patient reports tolerating them without any issues or concerns: Yes    There were no vitals filed for this visit.    After 5 minutes, the patient's blood pressure remained greater than or equal to 140/90.    Is the patient currently having any chest pain? No  Does the patient currently have a headache? No  Does the patient currently have any vision changes? No  Does the patient currently have any nausea? No  Does the patient currently have any abdominal pain? No    The previous encounter was reviewed.  The patient was discharged and the note will be sent to the provider for final review.       Pt did c/o upper back/neck pain. States that  massages area, but it comes back a couple hours later. Advised [facility] wait time. Pt declined.       Giorgio Mckeon Jr., CMA on 11/14/2022 at 1:48 PM

## 2022-12-09 ENCOUNTER — TELEPHONE (OUTPATIENT)
Dept: INTERNAL MEDICINE | Facility: CLINIC | Age: 62
End: 2022-12-09

## 2022-12-09 NOTE — TELEPHONE ENCOUNTER
Please call pt- she should be seen for follow up of hypertension since her last blood pressure reading was still very high. Please schedule for an office visit with me in the next 2-3 weeks

## 2022-12-09 NOTE — TELEPHONE ENCOUNTER
Attempted to call patient. Line kept ringing non-stop could not leave voicemail. Will try again later.

## 2022-12-09 NOTE — LETTER
December 15, 2022      Adam Jernigan  [address] [facility] [id number]        Dear Adam,       We have been trying to contact you but have been unsuccessful. Please give us a call at [phone] to schedule a follow up visit with Isidra Marte in regards of your hypertension.          Sincerely,        Isidra Marte NP

## 2022-12-13 NOTE — TELEPHONE ENCOUNTER
Not able to get hold of patient. Phone line continues to ring not able to leave message.     Bria Alcantar MA on 12/13/2022 at 8:42 AM

## 2022-12-15 NOTE — TELEPHONE ENCOUNTER
Not able to reach patient. Line continues to ring unable to leave message.    Letter sent.    Bria Alcantar MA on 12/15/2022 at 12:57 PM

## 2023-01-25 ENCOUNTER — OFFICE VISIT (OUTPATIENT)
Dept: INTERNAL MEDICINE | Facility: CLINIC | Age: 63
End: 2023-01-25
Payer: COMMERCIAL

## 2023-01-25 VITALS
SYSTOLIC BLOOD PRESSURE: 152 MMHG | BODY MASS INDEX: 26 KG/M2 | OXYGEN SATURATION: 99 % | RESPIRATION RATE: 21 BRPM | HEIGHT: 57 IN | HEART RATE: 70 BPM | TEMPERATURE: 98.1 F | DIASTOLIC BLOOD PRESSURE: 78 MMHG | WEIGHT: 120.5 LBS

## 2023-01-25 DIAGNOSIS — N18.31 STAGE 3A CHRONIC KIDNEY DISEASE (H): ICD-10-CM

## 2023-01-25 DIAGNOSIS — I10 PRIMARY HYPERTENSION: ICD-10-CM

## 2023-01-25 DIAGNOSIS — Z12.31 VISIT FOR SCREENING MAMMOGRAM: ICD-10-CM

## 2023-01-25 DIAGNOSIS — Z12.4 CERVICAL CANCER SCREENING: ICD-10-CM

## 2023-01-25 LAB
ANION GAP SERPL CALCULATED.3IONS-SCNC: 14 MMOL/L (ref 7–15)
BUN SERPL-MCNC: 23.5 MG/DL (ref 8–23)
CALCIUM SERPL-MCNC: 9.8 MG/DL (ref 8.8–10.2)
CHLORIDE SERPL-SCNC: 104 MMOL/L (ref 98–107)
CREAT SERPL-MCNC: 1.19 MG/DL (ref 0.51–0.95)
DEPRECATED HCO3 PLAS-SCNC: 24 MMOL/L (ref 22–29)
GFR SERPL CREATININE-BSD FRML MDRD: 51 ML/MIN/1.73M2
GLUCOSE SERPL-MCNC: 91 MG/DL (ref 70–99)
POTASSIUM SERPL-SCNC: 3.6 MMOL/L (ref 3.4–5.3)
SODIUM SERPL-SCNC: 142 MMOL/L (ref 136–145)

## 2023-01-25 PROCEDURE — 90471 IMMUNIZATION ADMIN: CPT | Performed by: NURSE PRACTITIONER

## 2023-01-25 PROCEDURE — 36415 COLL VENOUS BLD VENIPUNCTURE: CPT | Performed by: NURSE PRACTITIONER

## 2023-01-25 PROCEDURE — 99214 OFFICE O/P EST MOD 30 MIN: CPT | Mod: 25 | Performed by: NURSE PRACTITIONER

## 2023-01-25 PROCEDURE — 84244 ASSAY OF RENIN: CPT | Mod: 90 | Performed by: NURSE PRACTITIONER

## 2023-01-25 PROCEDURE — 80048 BASIC METABOLIC PNL TOTAL CA: CPT | Performed by: NURSE PRACTITIONER

## 2023-01-25 PROCEDURE — 82088 ASSAY OF ALDOSTERONE: CPT | Performed by: NURSE PRACTITIONER

## 2023-01-25 PROCEDURE — 90715 TDAP VACCINE 7 YRS/> IM: CPT | Performed by: NURSE PRACTITIONER

## 2023-01-25 PROCEDURE — 99000 SPECIMEN HANDLING OFFICE-LAB: CPT | Performed by: NURSE PRACTITIONER

## 2023-01-25 RX ORDER — HYDROCHLOROTHIAZIDE 25 MG/1
25 TABLET ORAL DAILY
Qty: 90 TABLET | Refills: 0 | Status: SHIPPED | OUTPATIENT
Start: 2023-01-25 | End: 2023-06-27

## 2023-01-25 RX ORDER — LOSARTAN POTASSIUM 100 MG/1
100 TABLET ORAL DAILY
Qty: 90 TABLET | Refills: 0 | Status: SHIPPED | OUTPATIENT
Start: 2023-01-25 | End: 2023-06-27

## 2023-01-25 RX ORDER — AMLODIPINE BESYLATE 10 MG/1
10 TABLET ORAL DAILY
Qty: 90 TABLET | Refills: 0 | Status: SHIPPED | OUTPATIENT
Start: 2023-01-25 | End: 2023-06-26

## 2023-01-25 RX ORDER — AMLODIPINE BESYLATE 5 MG/1
5 TABLET ORAL DAILY
Qty: 90 TABLET | Refills: 0 | Status: CANCELLED | OUTPATIENT
Start: 2023-01-25

## 2023-01-25 NOTE — PATIENT INSTRUCTIONS
- increase the amlodipine to 10 mg daily    - call the clinic with your blood pressure readings from home in 2 weeks

## 2023-01-25 NOTE — LETTER
2023      Adam Jernigan  [address] [facility] [address]        Dear ,    We are writing to inform you of your test results.    Your labs show that you have chronic kidney disease as we have seen with other lab tests. Make sure you drink plenty of water and avoid taking NSAID pain relievers (these are medications like ibuprofen, Aleve, and others)     The hormones that I checked because of your high blood pressure are normal.     Isidra      Resulted Orders   Basic metabolic panel   Result Value Ref Range    Sodium 142 136 - 145 mmol/L    Potassium 3.6 3.4 - 5.3 mmol/L    Chloride 104 98 - 107 mmol/L    Carbon Dioxide (CO2) 24 22 - 29 mmol/L    Anion Gap 14 7 - 15 mmol/L    Urea Nitrogen 23.5 (H) 8.0 - 23.0 mg/dL    Creatinine 1.19 (H) 0.51 - 0.95 mg/dL    Calcium 9.8 8.8 - 10.2 mg/dL    Glucose 91 70 - 99 mg/dL    GFR Estimate 51 (L) >60 mL/min/1.73m2      Comment:      eGFR calculated using  CKD-EPI equation.   Aldosterone   Result Value Ref Range    Aldosterone 9.3 0.0 - 31.0 ng/dL   Renin activity   Result Value Ref Range    Renin Activity 7.8 ng/mL/hr      Comment:      INTERPRETIVE INFORMATION: Renin Activity    Adult, Normal sodium diet:    Supine ................. 0.2-1.6 ng/mL/hr    Upright ................ 0.5-4.0 ng/mL/hr    Children, Normal sodium diet, Supine:     (1-7 days) ..... 2.0-35.0 ng/mL/hr    Cord blood ............. 4.0-32.0 ng/mL/hr    1-12 mos ............... 2.4-37.0 ng/mL/hr    13 mos-3 yrs ........... 1.7-11.2 ng/mL/hr    4-5 yrs ................ 1.0- 6.5 ng/mL/hr    6-10 yrs ............... 0.5- 5.9 ng/mL/hr    11-15 yrs .............. 0.5- 3.3 ng/mL/hr    Children, normal sodium diet, Upright:    0-3 yrs ................ Not Available    4-5 yrs ................ Less than or equal to 15 ng/mL/hr    6-10 yrs ............... Less than or equal to 17 ng/mL/hr    11-15 yrs .............. Less than or equal to 16 ng/mL/hr    Plasma renin activity measures enzyme  ability to convert   angiotensinogen to angiotensin I and is limited by the   availability of angiotensinogen. Plasma renin activity is   not an accurate indicator  of enzyme activity when   angiotensinogen is decreased.    This test was developed and its performance characteristics   determined by [facility]. It has not been cleared or   approved by the US Food and Drug Administration. This test   was performed in a CLIA certified laboratory and is   intended for clinical purposes.  Performed By: [facility]  [address] [place] [id number]  : Hitesh Knight MD, PhD   Aldosterone Renin Ratio   Result Value Ref Range    Aldosterone Renin Ratio 1.2 0.0 - 25.0       If you have any questions or concerns, please call the clinic at the number listed above.       Sincerely,      Isidra Marte NP

## 2023-01-25 NOTE — ASSESSMENT & PLAN NOTE
Not at goal  Aldosterone/renin labs today due to uncontrolled HTN on 3 BP agents  INCREASE amlodipine to 10 mg daily  She declines to come in for a nurse visit BP check but will call in 2-3 weeks to report her ambulatory readings

## 2023-01-25 NOTE — PROGRESS NOTES
"  Assessment & Plan   Problem List Items Addressed This Visit        Circulatory    HTN (hypertension)     Not at goal  Aldosterone/renin labs today due to uncontrolled HTN on 3 BP agents  INCREASE amlodipine to 10 mg daily  She declines to come in for a nurse visit BP check but will call in 2-3 weeks to report her ambulatory readings          Relevant Medications    losartan (COZAAR) 100 MG tablet    hydrochlorothiazide (HYDRODIURIL) 25 MG tablet    amLODIPine (NORVASC) 10 MG tablet    Other Relevant Orders    Aldosterone    Renin activity    Aldosterone Renin Ratio    Basic metabolic panel       Urinary    Chronic kidney disease, stage 3 (H)     Repeat BMP today. Avoid NSAIDS, continue good hydration         Other Visit Diagnoses     Visit for screening mammogram        Cervical cancer screening             - Reviewed cancer screening tests appropriate for her age/gender again today. She declines all- mammogram, pap smear, colon cancer screening        BMI:   Estimated body mass index is 25.85 kg/m  as calculated from the following:    Height as of this encounter: 1.454 m (4' 9.24\").    Weight as of this encounter: 54.7 kg (120 lb 8 oz).       Return in about 8 months (around 9/25/2023) for Routine preventive.    Isidra Marte NP  [facility]    Jose Medina is a 62 year old accompanied by her self, presenting for the following health issues:  Recheck Medication    HTN- She reports that she is taking the 3 HTN medications regularly. She took her mediations this morning. She reports a lot of family stress, her sister-in-law regularly asks their family for money. She exercises regularly.     We reviewed her reported familial stress. She denies thoughts of suicide or self harm. She reports that she cannot stop thinking about when her sister-in-law asks for money. She is not interested in any further intervention for mood.     She may be retiring at the end of this year.     History of Present " "Illness       Reason for visit:  Review medications    She eats 2-3 servings of fruits and vegetables daily.She consumes 0 sweetened beverage(s) daily.She exercises with enough effort to increase her heart rate 9 or less minutes per day.  She exercises with enough effort to increase her heart rate 3 or less days per week.   She is taking medications regularly.           Objective    BP (!) 152/78   Pulse 70   Temp 98.1  F (36.7  C) (Oral)   Resp 21   Ht 1.454 m (4' 9.24\")   Wt 54.7 kg (120 lb 8 oz)   SpO2 99%   BMI 25.85 kg/m    Body mass index is 25.85 kg/m .     Physical Exam   GENERAL: healthy, alert and no distress  RESP: lungs clear to auscultation - no rales, rhonchi or wheezes  CV: regular rate and rhythm, normal S1 S2, no S3 or S4, no murmur                "

## 2023-01-27 LAB — ALDOST SERPL-MCNC: 9.3 NG/DL (ref 0–31)

## 2023-01-28 LAB — RENIN PLAS-CCNC: 7.8 NG/ML/HR

## 2023-01-29 LAB — ALDOST/RENIN PLAS-RTO: 1.2 {RATIO} (ref 0–25)

## 2023-02-01 ENCOUNTER — OFFICE VISIT (OUTPATIENT)
Dept: FAMILY MEDICINE | Facility: CLINIC | Age: 63
End: 2023-02-01
Payer: COMMERCIAL

## 2023-02-01 VITALS
HEART RATE: 60 BPM | OXYGEN SATURATION: 98 % | RESPIRATION RATE: 16 BRPM | BODY MASS INDEX: 26.91 KG/M2 | TEMPERATURE: 97.6 F | DIASTOLIC BLOOD PRESSURE: 81 MMHG | WEIGHT: 125.4 LBS | SYSTOLIC BLOOD PRESSURE: 208 MMHG

## 2023-02-01 DIAGNOSIS — M10.9 ACUTE GOUT INVOLVING TOE OF LEFT FOOT, UNSPECIFIED CAUSE: Primary | ICD-10-CM

## 2023-02-01 PROCEDURE — 99213 OFFICE O/P EST LOW 20 MIN: CPT | Performed by: FAMILY MEDICINE

## 2023-02-01 RX ORDER — PREDNISONE 20 MG/1
TABLET ORAL
Qty: 20 TABLET | Refills: 0 | Status: SHIPPED | OUTPATIENT
Start: 2023-02-01 | End: 2023-04-21

## 2023-02-01 NOTE — PROGRESS NOTES
Assessment:       Acute gout involving toe of left foot, unspecified cause  - predniSONE (DELTASONE) 20 MG tablet  Dispense: 20 tablet; Refill: 0         Plan:     Symptoms consistent with likely acute gout of the left great toe.  Prescription given for burst and taper of prednisone.  Discussed foods to avoid.  Information given.  All questions answered.  Follow-up with PCP if symptoms getting worse or not improving in 3 to 4 days.    MEDICATIONS:   Orders Placed This Encounter   Medications     predniSONE (DELTASONE) 20 MG tablet     Sig: Take 3 tabs by mouth daily x 3 days, then 2 tabs daily x 3 days, then 1 tab daily x 3 days, then 1/2 tab daily x 3 days.     Dispense:  20 tablet     Refill:  0       Subjective:       62 year old female presents for evaluation 3-day history of pain at the base of her left great toe along with redness and some slight swelling.  She denies any injury.  She has had problems with gout in the past.  She suspect this is likely a gout attack.    Patient Active Problem List   Diagnosis     HTN (hypertension)     HLD (hyperlipidemia), 10-year ASCVD risk 5.2% 2/2020     Constipation     Chronic kidney disease, stage 3 (H)       Past Medical History:   Diagnosis Date     HLD (hyperlipidemia)      HTN (hypertension)      TIA (transient ischemic attack)        No past surgical history on file.    Current Outpatient Medications   Medication     amLODIPine (NORVASC) 10 MG tablet     hydrochlorothiazide (HYDRODIURIL) 25 MG tablet     losartan (COZAAR) 100 MG tablet     predniSONE (DELTASONE) 20 MG tablet     cetirizine (ZYRTEC) 10 MG tablet     fluticasone propionate (FLONASE) 50 mcg/actuation nasal spray     No current facility-administered medications for this visit.       No Known Allergies    No family history on file.    Social History     Socioeconomic History     Marital status:      Spouse name: None     Number of children: 4     Years of education: None     Highest education  level: None   Tobacco Use     Smoking status: Never     Smokeless tobacco: Never   Substance and Sexual Activity     Alcohol use: No     Drug use: No         Review of Systems  Pertinent items are noted in HPI.      Objective:     BP (!) 208/81 (BP Location: Right arm, Patient Position: Sitting, Cuff Size: Adult Regular)   Pulse 60   Temp 97.6  F (36.4  C) (Oral)   Resp 16   Wt 56.9 kg (125 lb 6.4 oz)   SpO2 98%   BMI 26.91 kg/m       General appearance: alert, appears stated age and cooperative  Extremities: She has left foot examined.  She has some swelling, exquisite tenderness, and mild erythema at the base of her left great toe.  Does not appear to be infectious.  There is no break in the skin.  Is neurovascularly intact.      This note has been dictated using voice recognition software. Any grammatical or context distortions are unintentional and inherent to the software

## 2023-04-21 ENCOUNTER — OFFICE VISIT (OUTPATIENT)
Dept: FAMILY MEDICINE | Facility: CLINIC | Age: 63
End: 2023-04-21
Payer: COMMERCIAL

## 2023-04-21 VITALS
WEIGHT: 122 LBS | HEART RATE: 56 BPM | SYSTOLIC BLOOD PRESSURE: 166 MMHG | OXYGEN SATURATION: 98 % | BODY MASS INDEX: 26.18 KG/M2 | TEMPERATURE: 97.8 F | DIASTOLIC BLOOD PRESSURE: 70 MMHG | RESPIRATION RATE: 16 BRPM

## 2023-04-21 DIAGNOSIS — M10.072 ACUTE IDIOPATHIC GOUT INVOLVING TOE OF LEFT FOOT: Primary | ICD-10-CM

## 2023-04-21 PROCEDURE — 99213 OFFICE O/P EST LOW 20 MIN: CPT | Performed by: NURSE PRACTITIONER

## 2023-04-21 RX ORDER — PREDNISONE 20 MG/1
40 TABLET ORAL DAILY
Qty: 10 TABLET | Refills: 0 | Status: SHIPPED | OUTPATIENT
Start: 2023-04-21 | End: 2023-04-26

## 2023-04-21 NOTE — PROGRESS NOTES
Patient presents with:  Foot Pain: X 02/01/23. Gout flare back up. Lt big toe.    Clinical Decision Making: Focused exam positive for left great toe with significant superficial tenderness, erythemic, swelling at PIP joint.  Uric acid pending.    Clinical presentation and medical decision making consistent with left great toe gout flare.  We will treat with a high-dose prednisone taper, encouraged to take with food increase water intake and avoid trigger foods at this time.  Strongly encourage patient follow-up with primary care provider to discuss options for prophylactic medication.  Reviewed red flag symptoms with gout flare and when to return for reevaluation, education provided.      ICD-10-CM    1. Acute idiopathic gout involving toe of left foot  M10.072 predniSONE (DELTASONE) 20 MG tablet     Uric acid          Patient Instructions   Follow-up with primary care provider to consider prophylactic medication for gout.      HPI: Adam Jernigan is a 62 year old female CKD stage III, hypertension, hyperlipidemia and gout history who presents today complaining of left great toe with erythremia, swelling and tenderness consistent with gout flare.  Patient endorses having significant amounts of seafood, oyster and soy sauce meals over the past 1 week.  Endorses pain is a 10 out of 10 that is mildly improved to a 4 out of 10 on the numeric pain scale today; following a herbal mung medication that was taken 24 hours ago. However, patient reports continued difficulty mobilizing due to significant left great toe tenderness.    History obtained from the patient and son.    Problem List:  2021-04: Chronic kidney disease, stage 3 (H)  2017-09: Constipation  2017-03: HTN (hypertension)  2017-03: HLD (hyperlipidemia), 10-year ASCVD risk 5.2% 2/2020      Past Medical History:   Diagnosis Date     HLD (hyperlipidemia)      HTN (hypertension)      TIA (transient ischemic attack)        Social History     Tobacco Use     Smoking  status: Never     Smokeless tobacco: Never   Vaping Use     Vaping status: Not on file   Substance Use Topics     Alcohol use: No       Review of Systems  As noted in HPI    Vitals:    04/21/23 1626   BP: (!) 166/70   Pulse: 56   Resp: 16   Temp: 97.8  F (36.6  C)   TempSrc: Oral   SpO2: 98%   Weight: 55.3 kg (122 lb)       Physical Exam  Constitutional:       Appearance: Normal appearance.   Cardiovascular:      Rate and Rhythm: Normal rate and regular rhythm.      Pulses: Normal pulses.      Heart sounds: Normal heart sounds.   Pulmonary:      Effort: Pulmonary effort is normal.      Breath sounds: Normal breath sounds.   Musculoskeletal:         General: Swelling and tenderness present. No signs of injury.      Comments: LEFT great toe with significant superficial tenderness, erythemic, swelling at PIP joint.    Skin:     General: Skin is warm.      Capillary Refill: Capillary refill takes less than 2 seconds.      Findings: Erythema present.   Neurological:      Mental Status: She is alert and oriented to person, place, and time.         Labs:  No results found for any visits on 04/21/23.    At the end of the encounter, I discussed results, diagnosis, medications. Discussed red flags for immediate return to clinic/ER, as well as indications for follow up if no improvement. Patient and son understood and agreed to plan.     PAOLA Constantino CNP

## 2023-04-22 ENCOUNTER — TELEPHONE (OUTPATIENT)
Dept: FAMILY MEDICINE | Facility: CLINIC | Age: 63
End: 2023-04-22
Payer: COMMERCIAL

## 2023-04-22 NOTE — TELEPHONE ENCOUNTER
Called and spoke with patient regarding uric acid results.  Patient indicated that she has had some minimal improvement since yesterday.  She also reports having a primary care provider appointment scheduled for follow-up.  She denied any questions.

## 2023-04-25 ENCOUNTER — OFFICE VISIT (OUTPATIENT)
Dept: INTERNAL MEDICINE | Facility: CLINIC | Age: 63
End: 2023-04-25
Payer: COMMERCIAL

## 2023-04-25 VITALS
HEIGHT: 57 IN | RESPIRATION RATE: 16 BRPM | SYSTOLIC BLOOD PRESSURE: 136 MMHG | WEIGHT: 124.3 LBS | TEMPERATURE: 97.8 F | BODY MASS INDEX: 26.82 KG/M2 | HEART RATE: 55 BPM | OXYGEN SATURATION: 98 % | DIASTOLIC BLOOD PRESSURE: 72 MMHG

## 2023-04-25 DIAGNOSIS — I10 PRIMARY HYPERTENSION: ICD-10-CM

## 2023-04-25 DIAGNOSIS — M10.072 ACUTE IDIOPATHIC GOUT INVOLVING TOE OF LEFT FOOT: Primary | ICD-10-CM

## 2023-04-25 PROCEDURE — 99213 OFFICE O/P EST LOW 20 MIN: CPT | Performed by: NURSE PRACTITIONER

## 2023-04-25 RX ORDER — ALLOPURINOL 100 MG/1
100 TABLET ORAL DAILY
Qty: 90 TABLET | Refills: 1 | Status: SHIPPED | OUTPATIENT
Start: 2023-04-25 | End: 2023-05-23

## 2023-04-25 RX ORDER — COLCHICINE 0.6 MG/1
0.6 TABLET ORAL DAILY
Qty: 90 TABLET | Refills: 0 | Status: SHIPPED | OUTPATIENT
Start: 2023-04-25 | End: 2024-01-22

## 2023-04-25 ASSESSMENT — PAIN SCALES - GENERAL: PAINLEVEL: MODERATE PAIN (4)

## 2023-04-25 NOTE — PROGRESS NOTES
"  Assessment & Plan   Problem List Items Addressed This Visit        Endocrine    Acute idiopathic gout involving toe of left foot - Primary      Discussed with patient to finish your last dose of prednisone tonight and will start allopurinol 100 mg daily AND colchicine 0.6 mg daily tomorrow to prevent gout flares. Educated patient to avoid high purine foods and encouraged increased water, fruit, and vegetable intake.   Follow-up with 4 weeks for labs and in 2 months to reassess.          Relevant Medications    allopurinol (ZYLOPRIM) 100 MG tablet    colchicine (COLCYRS) 0.6 MG tablet    Other Relevant Orders    Uric acid    Basic metabolic panel       Circulatory    HTN (hypertension)     Encouraged patient to eat a low sodium diet. Discussed AHA exercise guidelines. Encouraged patient to routinely monitor blood pressure and keep a record of readings.            BMI:   Estimated body mass index is 26.67 kg/m  as calculated from the following:    Height as of this encounter: 1.454 m (4' 9.24\").    Weight as of this encounter: 56.4 kg (124 lb 4.8 oz).   Weight management plan: Discussed healthy diet and exercise guidelines    See Patient Instructions      I was present with the nurse practitioner student, Xavi Wilder, who participated in the service and in the documentation of the note. I have verified the history and personally performed the physical exam and medical decision making. I agree with the assessment and plan of care as documented in the note.  Isidra Marte, CHRISTEN, APRN, CNP   [facility]    Jose   Adam is a 62 year old, presenting for the following health issues:  Acute Gout Flare of Left Toe Follow-Up     HPI         Acute Gout Flare of Left Toe Follow-Up   Patient seen 4/21/23 at [facility] for acute idiopathic gout involving Left great toe. Patient given prednisone taper with 1 day left of current medication. Patient reports improvement of Left " "great toe with no pain, stiffness, limitation in ROM, or warmth. Reports decreased erythema and edema of toe. Reports acute gout episodes in the past associated with eating fatty foods and shellfish. Patient desires medication to prevent gout flares.     Hypertension Follow-up  Patient reports taking blood pressure medications daily. Reports headache and vision changes when blood pressure is high, occurring on average once per month. Denies heart palpitations, lightheadedness/dizziness, SOB, or edema in peripheral extremities.       Do you check your blood pressure regularly outside of the clinic? No     Are you following a low salt diet? No    Are your blood pressures ever more than 140 on the top number (systolic) OR more   than 90 on the bottom number (diastolic), for example 140/90? Yes    How many days per week do you miss taking your medication? 0    Review of Systems   CONSTITUTIONAL:NEGATIVE for fever, chills, change in weight  INTEGUMENTARY/SKIN: NEGATIVE for edema or warmth and POSITIVE for mild erythema of Left great toe  EYES: NEGATIVE for vision changes or irritation  RESP:NEGATIVE for significant cough or SOB  CV: NEGATIVE for chest pain, palpitations or peripheral edema  : POSITIVE for history of kidney stones (2005)  MUSCULOSKELETAL: NEGATIVE for significant arthralgias or myalgia and POSITIVE for Hx gout and Tophi of Left great toe      Objective    /72   Pulse 55   Temp 97.8  F (36.6  C) (Oral)   Resp 16   Ht 1.454 m (4' 9.24\")   Wt 56.4 kg (124 lb 4.8 oz)   SpO2 98%   BMI 26.67 kg/m    Body mass index is 26.67 kg/m .  Physical Exam   GENERAL: healthy, alert and no distress  CV: regular rate and rhythm, normal S1 S2, no S3 or S4, no murmur, click or rub, no peripheral edema and peripheral pulses strong  MS: Left lateral great toe metatarsophalangeal (MTP) joint with mild erythema and 0.5 inch diameter tophi. No warmth or tenderness noted on palpation. Full ROM in Left foot and " toes.

## 2023-04-25 NOTE — ASSESSMENT & PLAN NOTE
Discussed with patient to finish your last dose of prednisone tonight and will start allopurinol 100 mg daily AND colchicine 0.6 mg daily tomorrow to prevent gout flares. Educated patient to avoid high purine foods and encouraged increased water, fruit, and vegetable intake.   Follow-up with 4 weeks for labs and in 2 months to reassess.

## 2023-04-25 NOTE — ASSESSMENT & PLAN NOTE
Encouraged patient to eat a low sodium diet. Discussed AHA exercise guidelines. Encouraged patient to routinely monitor blood pressure and keep a record of readings.

## 2023-04-25 NOTE — PATIENT INSTRUCTIONS
- Finish your last dose of prednisone tonight  - Starting tomorrow: start both allopurinol 100 mg daily AND colchicine 0.6 mg daily. You will likely need to take colchicine for about 3 months to prevent a gout attack from happening while we are getting you started on allopurinol  - Follow up lab test in 4 weeks to help adjust the allopurinol. We will let you know how to adjust the medication  - See me back in 2 months

## 2023-05-22 ENCOUNTER — LAB (OUTPATIENT)
Dept: LAB | Facility: CLINIC | Age: 63
End: 2023-05-22
Payer: COMMERCIAL

## 2023-05-22 DIAGNOSIS — M10.072 ACUTE IDIOPATHIC GOUT INVOLVING TOE OF LEFT FOOT: ICD-10-CM

## 2023-05-22 LAB
ANION GAP SERPL CALCULATED.3IONS-SCNC: 13 MMOL/L (ref 7–15)
BUN SERPL-MCNC: 21 MG/DL (ref 8–23)
CALCIUM SERPL-MCNC: 9.4 MG/DL (ref 8.8–10.2)
CHLORIDE SERPL-SCNC: 101 MMOL/L (ref 98–107)
CREAT SERPL-MCNC: 1.17 MG/DL (ref 0.51–0.95)
DEPRECATED HCO3 PLAS-SCNC: 28 MMOL/L (ref 22–29)
GFR SERPL CREATININE-BSD FRML MDRD: 53 ML/MIN/1.73M2
GLUCOSE SERPL-MCNC: 115 MG/DL (ref 70–99)
POTASSIUM SERPL-SCNC: 2.9 MMOL/L (ref 3.4–5.3)
SODIUM SERPL-SCNC: 142 MMOL/L (ref 136–145)
URATE SERPL-MCNC: 7.4 MG/DL (ref 2.4–5.7)

## 2023-05-22 PROCEDURE — 84550 ASSAY OF BLOOD/URIC ACID: CPT

## 2023-05-22 PROCEDURE — 36415 COLL VENOUS BLD VENIPUNCTURE: CPT

## 2023-05-22 PROCEDURE — 80048 BASIC METABOLIC PNL TOTAL CA: CPT

## 2023-05-23 DIAGNOSIS — M10.072 ACUTE IDIOPATHIC GOUT INVOLVING TOE OF LEFT FOOT: ICD-10-CM

## 2023-05-23 RX ORDER — ALLOPURINOL 100 MG/1
200 TABLET ORAL DAILY
Qty: 180 TABLET | Refills: 1 | Status: SHIPPED | OUTPATIENT
Start: 2023-05-23 | End: 2023-06-14 | Stop reason: DRUGHIGH

## 2023-06-05 ENCOUNTER — NURSE TRIAGE (OUTPATIENT)
Dept: NURSING | Facility: CLINIC | Age: 63
End: 2023-06-05
Payer: COMMERCIAL

## 2023-06-05 NOTE — TELEPHONE ENCOUNTER
"Nurse Triage SBAR    Is this a 2nd Level Triage? YES, LICENSED PRACTITIONER REVIEW IS REQUIRED    Situation: Daughter calling to report swelling in patient's feet and feels this may be due to gout medication 4-25-23.  Started foot swelling a couple days after that.  Caller has consent to communicate and patient with caller.    Background: Started Allopurinol and Colchicine 4-25-23 for gout per patient and developed foot swelling a few days later.    Assessment: Patient reports she feels like her face may have some swelling also, but \"not too much.\"  Some tenderness to touch on feet but denies calf/thigh pain, dyspnea, chest pain.      Protocol Recommended Disposition:   Go To Office Now    Recommendation: Patient feels it is the gout medication.  Patient doesn't feel like she needs to come in.  She would like to stop both medications.  What does provider recommend?    Patient contact number [phone]    Routed to provider    Does the patient meet one of the following criteria for ADS visit consideration? 16+ years old, with an [facility] PCP     TIP  Providers, please consider if this condition is appropriate for management at one of our Acute and Diagnostic Services sites.     If patient is a good candidate, please use dotphrase <dot>triageresponse and select Refer to ADS to document.      Reason for Disposition    Swelling of face, arm or hands  (Exception: Slight puffiness of fingers during hot weather.)    Additional Information    Negative: Sounds like a life-threatening emergency to the triager    Negative: Chest pain    Negative: Small area of swelling and followed an insect bite to the area    Negative: Followed a knee injury    Negative: Ankle or foot injury    Negative: Pregnant with leg swelling or edema    Negative: Difficulty breathing at rest    Negative: Entire foot is cool or blue in comparison to other side    Negative: SEVERE swelling (e.g., swelling extends above knee, entire leg is swollen, weeping " fluid)    Negative: Thigh or calf pain and only 1 side and present > 1 hour    Negative: Thigh, calf, or ankle swelling in only one leg    Negative: Thigh, calf, or ankle swelling in both legs, but one side is definitely more swollen (Exception: longstanding difference between legs)    Negative: Cast on leg or ankle and has increasing pain    Negative: Can't walk or can barely stand (new-onset)    Negative: Fever and red area (or area very tender to touch)    Negative: Patient sounds very sick or weak to the triager    Protocols used: LEG SWELLING AND EDEMA-A-OH

## 2023-06-05 NOTE — TELEPHONE ENCOUNTER
Patient Returning Call    Reason for call:  Returning Call to Clinic    Information relayed to patient:  Relayed message to Daughter Fabio.  She will discuss with Patient.  Patient is not with her, so she declined offer to schedule appt.  States she will call back to schedule appointment.     Patient has additional questions:  No

## 2023-06-05 NOTE — TELEPHONE ENCOUNTER
Swelling is not a typical reaction for either of these medication. I recommend a follow up office visit and I do not recommend that she stop these medications that are meant to prevent gout flares.

## 2023-06-05 NOTE — TELEPHONE ENCOUNTER
Left message for Fabio to return call to clinic.  Message included PCP has gotten message and responded.

## 2023-06-13 ENCOUNTER — OFFICE VISIT (OUTPATIENT)
Dept: FAMILY MEDICINE | Facility: CLINIC | Age: 63
End: 2023-06-13
Payer: COMMERCIAL

## 2023-06-13 VITALS
HEART RATE: 65 BPM | TEMPERATURE: 98.2 F | OXYGEN SATURATION: 96 % | SYSTOLIC BLOOD PRESSURE: 132 MMHG | RESPIRATION RATE: 16 BRPM | DIASTOLIC BLOOD PRESSURE: 67 MMHG

## 2023-06-13 DIAGNOSIS — R60.0 BILATERAL EDEMA OF LOWER EXTREMITY: ICD-10-CM

## 2023-06-13 DIAGNOSIS — M10.072 ACUTE IDIOPATHIC GOUT INVOLVING TOE OF LEFT FOOT: ICD-10-CM

## 2023-06-13 DIAGNOSIS — E87.6 HYPOKALEMIA: Primary | ICD-10-CM

## 2023-06-13 LAB
ALBUMIN SERPL BCG-MCNC: 4.2 G/DL (ref 3.5–5.2)
ALP SERPL-CCNC: 72 U/L (ref 35–104)
ALT SERPL W P-5'-P-CCNC: 39 U/L
ANION GAP SERPL CALCULATED.3IONS-SCNC: 11 MMOL/L (ref 7–15)
AST SERPL W P-5'-P-CCNC: 46 U/L (ref 0–45)
BILIRUB SERPL-MCNC: 0.4 MG/DL
BUN SERPL-MCNC: 23 MG/DL (ref 8–23)
CALCIUM SERPL-MCNC: 9.4 MG/DL (ref 8.8–10.2)
CHLORIDE SERPL-SCNC: 103 MMOL/L (ref 98–107)
CREAT SERPL-MCNC: 1.04 MG/DL (ref 0.51–0.95)
DEPRECATED HCO3 PLAS-SCNC: 27 MMOL/L (ref 22–29)
GFR SERPL CREATININE-BSD FRML MDRD: 60 ML/MIN/1.73M2
GLUCOSE SERPL-MCNC: 122 MG/DL (ref 70–99)
POTASSIUM SERPL-SCNC: 3.2 MMOL/L (ref 3.4–5.3)
PROT SERPL-MCNC: 7.6 G/DL (ref 6.4–8.3)
SODIUM SERPL-SCNC: 141 MMOL/L (ref 136–145)
URATE SERPL-MCNC: 7.2 MG/DL (ref 2.4–5.7)

## 2023-06-13 PROCEDURE — 80053 COMPREHEN METABOLIC PANEL: CPT | Performed by: PHYSICIAN ASSISTANT

## 2023-06-13 PROCEDURE — 36415 COLL VENOUS BLD VENIPUNCTURE: CPT | Performed by: PHYSICIAN ASSISTANT

## 2023-06-13 PROCEDURE — 84550 ASSAY OF BLOOD/URIC ACID: CPT | Performed by: PHYSICIAN ASSISTANT

## 2023-06-13 PROCEDURE — 99214 OFFICE O/P EST MOD 30 MIN: CPT | Performed by: PHYSICIAN ASSISTANT

## 2023-06-13 RX ORDER — POTASSIUM CHLORIDE 750 MG/1
20 TABLET, EXTENDED RELEASE ORAL 2 TIMES DAILY
Qty: 28 TABLET | Refills: 0 | Status: SHIPPED | OUTPATIENT
Start: 2023-06-13 | End: 2023-06-20

## 2023-06-13 NOTE — PROGRESS NOTES
URGENT CARE VISIT:    SUBJECTIVE:   Chief Complaint   Patient presents with     Bilateral foot swelling     Bilateral feet have been swollen for 2 weeks. She has been taking newer medications started end of April. Painful to touch and bilateral calves feel tight. Nothing seems to help with swelling to decrease. Seems worse with activity.       Adam Jernigan is a 62 year old female who presents with a chief complaint of bilateral leg swelling. Symptoms began 2 week(s) ago, are moderate and sudden onset    Symptoms exacerbated by walking. Relieved by elevation. She treated it initially with no therapy. This is the first time this type of injury has occurred to this patient.     PMH:   Past Medical History:   Diagnosis Date     HLD (hyperlipidemia)      HTN (hypertension)      TIA (transient ischemic attack)      Allergies: Patient has no known allergies.   Medications:   Current Outpatient Medications   Medication Sig Dispense Refill     allopurinol (ZYLOPRIM) 100 MG tablet Take 2 tablets (200 mg) by mouth daily 180 tablet 1     amLODIPine (NORVASC) 10 MG tablet Take 1 tablet (10 mg) by mouth daily 90 tablet 0     colchicine (COLCYRS) 0.6 MG tablet Take 1 tablet (0.6 mg) by mouth daily 90 tablet 0     hydrochlorothiazide (HYDRODIURIL) 25 MG tablet Take 1 tablet (25 mg) by mouth daily 90 tablet 0     losartan (COZAAR) 100 MG tablet Take 1 tablet (100 mg) by mouth daily 90 tablet 0     Social History:   Social History     Tobacco Use     Smoking status: Never     Smokeless tobacco: Never   Vaping Use     Vaping status: Not on file   Substance Use Topics     Alcohol use: No       ROS:  Review of systems negative except as stated above.    OBJECTIVE:  /67 (BP Location: Left arm, Patient Position: Sitting, Cuff Size: Adult Regular)   Pulse 65   Temp 98.2  F (36.8  C) (Tympanic)   Resp 16   SpO2 96%   GENERAL APPEARANCE: healthy, alert and no distress  MUSCULOSKELETAL: no TTP over bilateral calves   EXTREMITIES:  peripheral pulses normal. Pitting edema 2+ bilaterally  SKIN: no rashes or erythema  NEURO: sensation intact     Labs:  Results for orders placed or performed in visit on 06/13/23   Comprehensive metabolic panel     Status: Abnormal   Result Value Ref Range    Sodium 141 136 - 145 mmol/L    Potassium 3.2 (L) 3.4 - 5.3 mmol/L    Chloride 103 98 - 107 mmol/L    Carbon Dioxide (CO2) 27 22 - 29 mmol/L    Anion Gap 11 7 - 15 mmol/L    Urea Nitrogen 23.0 8.0 - 23.0 mg/dL    Creatinine 1.04 (H) 0.51 - 0.95 mg/dL    Calcium 9.4 8.8 - 10.2 mg/dL    Glucose 122 (H) 70 - 99 mg/dL    Alkaline Phosphatase 72 35 - 104 U/L    AST 46 (H) 0 - 45 U/L    ALT 39 U/L    Protein Total 7.6 6.4 - 8.3 g/dL    Albumin 4.2 3.5 - 5.2 g/dL    Bilirubin Total 0.4 <=1.2 mg/dL    GFR Estimate 60 (L) >60 mL/min/1.73m2         ASSESSMENT:    ICD-10-CM    1. Bilateral edema of lower extremity  R60.0 Comprehensive metabolic panel     Comprehensive metabolic panel      2. Acute idiopathic gout involving toe of left foot  M10.072 Uric acid     CANCELED: Potassium          PLAN:  30 minutes spent by me on the date of the encounter doing chart review, review of outside records, review of test results, interpretation of tests, patient visit, documentation and discussion with family   Patient Instructions   Patient was educated on the natural course of condition. Started on potassium tablets for mild hypokalemia. Conservative measures discussed including compression stockings, elevation, and low salt diet. See your primary care provider if symptoms worsen or do not improve in 7 days. Seek emergency care if you develop severe pain/swelling, or shortness of breath.  Patient verbalized understanding and is agreeable to plan. The patient was discharged ambulatory and in stable condition.    Dara Richards PA-C on 6/13/2023 at 4:21 PM

## 2023-06-13 NOTE — PATIENT INSTRUCTIONS
Patient was educated on the natural course of condition.  Underlying cause must be treated. Conservative measures discussed including compression stockings, elevation, and low salt diet. See your primary care provider if symptoms worsen or do not improve in 7 days. Seek emergency care if you develop severe pain/swelling, or shortness of breath.

## 2023-06-13 NOTE — LETTER
Kenna 15, 2023      Adam Jernigan  [address] [facility] [address] [facility] [address]        Dear ,    We are writing to inform you of your test results.    {results letter list:[id number]}    Resulted Orders   Uric acid   Result Value Ref Range    Uric Acid 7.2 (H) 2.4 - 5.7 mg/dL   Comprehensive metabolic panel   Result Value Ref Range    Sodium 141 136 - 145 mmol/L    Potassium 3.2 (L) 3.4 - 5.3 mmol/L    Chloride 103 98 - 107 mmol/L    Carbon Dioxide (CO2) 27 22 - 29 mmol/L    Anion Gap 11 7 - 15 mmol/L    Urea Nitrogen 23.0 8.0 - 23.0 mg/dL    Creatinine 1.04 (H) 0.51 - 0.95 mg/dL    Calcium 9.4 8.8 - 10.2 mg/dL    Glucose 122 (H) 70 - 99 mg/dL    Alkaline Phosphatase 72 35 - 104 U/L    AST 46 (H) 0 - 45 U/L      Comment:      Reference intervals for this test were updated on 6/12/2023 to more accurately reflect our healthy population. There may be differences in the flagging of prior results with similar values performed with this method. Interpretation of those prior results can be made in the context of the updated reference intervals.    ALT 39 U/L      Comment:      Reference intervals for this test were updated on 6/12/2023 to more accurately reflect our healthy population. There may be differences in the flagging of prior results with similar values performed with this method. Interpretation of those prior results can be made in the context of the updated reference intervals.      Protein Total 7.6 6.4 - 8.3 g/dL    Albumin 4.2 3.5 - 5.2 g/dL    Bilirubin Total 0.4 <=1.2 mg/dL    GFR Estimate 60 (L) >60 mL/min/1.73m2      Comment:      eGFR calculated using 2021 CKD-EPI equation.       If you have any questions or concerns, please call the clinic at the number listed above.       Sincerely,      Dara Richards PA-C

## 2023-06-14 DIAGNOSIS — M10.072 ACUTE IDIOPATHIC GOUT INVOLVING TOE OF LEFT FOOT: Primary | ICD-10-CM

## 2023-06-14 RX ORDER — ALLOPURINOL 300 MG/1
300 TABLET ORAL DAILY
Qty: 90 TABLET | Refills: 0 | Status: SHIPPED | OUTPATIENT
Start: 2023-06-14 | End: 2023-09-07

## 2023-06-15 ENCOUNTER — TELEPHONE (OUTPATIENT)
Dept: INTERNAL MEDICINE | Facility: CLINIC | Age: 63
End: 2023-06-15
Payer: COMMERCIAL

## 2023-06-15 NOTE — TELEPHONE ENCOUNTER
Called patient to relay below message from provider. Relayed the message and patient reports that she wants to tell PCP that she is 'not going to take the medication for gout.' Patient states that she took the medication from end of April until last week and stopped taking it because of 'foot swelling'. When I asked her if her feet are better she states they are no longer swollen.    Advised patient I would send this to her PCP, and we would follow up with any concerns. Otherwise, patient still plans to attend her upcoming office visit on 6/26 with Isidra Marte.      ----- Message from Isidra Marte NP sent at 6/14/2023  9:43 AM CDT -----  Call pt-potassium level is better.  At her upcoming appointment and going to discuss a change in her blood pressure medication to help her potassium and to control her blood pressure    Uric acid level prevention of gout is elevated I recommended we increase allopurinol to 300 mg daily.  I sent in prescription to her pharmacy       Lucrecia Wallwood Clinical Staff

## 2023-06-26 ENCOUNTER — OFFICE VISIT (OUTPATIENT)
Dept: INTERNAL MEDICINE | Facility: CLINIC | Age: 63
End: 2023-06-26
Payer: COMMERCIAL

## 2023-06-26 VITALS
DIASTOLIC BLOOD PRESSURE: 86 MMHG | HEART RATE: 60 BPM | TEMPERATURE: 98 F | BODY MASS INDEX: 26.25 KG/M2 | OXYGEN SATURATION: 98 % | RESPIRATION RATE: 16 BRPM | WEIGHT: 121.7 LBS | SYSTOLIC BLOOD PRESSURE: 144 MMHG | HEIGHT: 57 IN

## 2023-06-26 DIAGNOSIS — I10 PRIMARY HYPERTENSION: Primary | ICD-10-CM

## 2023-06-26 DIAGNOSIS — M10.072 ACUTE IDIOPATHIC GOUT INVOLVING TOE OF LEFT FOOT: ICD-10-CM

## 2023-06-26 PROCEDURE — 99214 OFFICE O/P EST MOD 30 MIN: CPT | Performed by: NURSE PRACTITIONER

## 2023-06-26 RX ORDER — SPIRONOLACTONE 25 MG/1
25 TABLET ORAL DAILY
Qty: 90 TABLET | Refills: 0 | Status: SHIPPED | OUTPATIENT
Start: 2023-06-26 | End: 2023-07-18 | Stop reason: DRUGHIGH

## 2023-06-26 ASSESSMENT — PAIN SCALES - GENERAL: PAINLEVEL: NO PAIN (0)

## 2023-06-26 NOTE — PATIENT INSTRUCTIONS
Problem List Items Addressed This Visit          Endocrine    Acute idiopathic gout involving toe of left foot     Continue allopurinol 300 mg. Repeat uric acid level in 2 weeks         Relevant Orders    Uric acid       Circulatory    HTN (hypertension) - Primary     STOP: amlodipine (I think this is the cause of the swelling in your legs)  START: spironolactone 25 mg daily  Return in 2 weeks for a blood pressure check and lab appointment          Relevant Medications    spironolactone (ALDACTONE) 25 MG tablet    Other Relevant Orders    Basic metabolic panel

## 2023-06-26 NOTE — PROGRESS NOTES
Assessment & Plan   Problem List Items Addressed This Visit        Endocrine    Acute idiopathic gout involving toe of left foot     Continue allopurinol 300 mg. Repeat uric acid level in 2 weeks         Relevant Orders    Uric acid       Circulatory    HTN (hypertension) - Primary     LE edema noted which has worsened since amlodipine was increased in January  STOP: amlodipine (I think this is the cause of the swelling in your legs)  START: spironolactone 25 mg daily which should help with hypokalemia, HTN, and swelling in lower extremities   Return in 2 weeks for a blood pressure check and lab appointment with a clinical assistant. If BP at goal, office visit in 6 months          Relevant Medications    spironolactone (ALDACTONE) 25 MG tablet    Other Relevant Orders    Basic metabolic panel           Isidra Marte NP  [facility]   Adam is a 62 year old, presenting for the following health issues:  Follow Up (Follow up to Gout and Hypertension )        6/26/2023     1:44 PM   Additional Questions   Roomed by Jaclyn SIMS MA   Accompanied by MARIE     History of Present Illness       Hypertension: She presents for follow up of hypertension.  She does not check blood pressure  regularly outside of the clinic. Outpatient blood pressures have not been over 140/90. She follows a low salt diet.     Reason for visit:  Gout    She eats 2-3 servings of fruits and vegetables daily.She consumes 1 sweetened beverage(s) daily.She exercises with enough effort to increase her heart rate 30 to 60 minutes per day.  She exercises with enough effort to increase her heart rate 6 days per week.   She is taking medications regularly.       Hypertension Follow-up      Do you check your blood pressure regularly outside of the clinic? No     Are you following a low salt diet? Yes    Are your blood pressures ever more than 140 on the top number (systolic) OR more   than 90 on the bottom number (diastolic),  "for example 140/90? No    She has had some swelling in the legs. When the left leg is swollen she has left low back pain and left posterior leg pain. No chest pain.         Objective    BP (!) 144/86 (BP Location: Right arm, Patient Position: Sitting, Cuff Size: Adult Regular)   Pulse 60   Temp 98  F (36.7  C) (Oral)   Resp 16   Ht 1.454 m (4' 9.24\")   Wt 55.2 kg (121 lb 11.2 oz)   SpO2 98%   BMI 26.12 kg/m    Body mass index is 26.12 kg/m .     Physical Exam   GENERAL: healthy, alert and no distress  RESP: lungs clear to auscultation - no rales, rhonchi or wheezes  CV: regular rate and rhythm, normal S1 S2, no S3 or S4, no murmur. Trace bilateral LE edema, slightly more prominent left lower extremity                 "

## 2023-06-26 NOTE — ASSESSMENT & PLAN NOTE
LE edema noted which has worsened since amlodipine was increased in January  STOP: amlodipine (I think this is the cause of the swelling in your legs)  START: spironolactone 25 mg daily which should help with hypokalemia, HTN, and swelling in lower extremities   Return in 2 weeks for a blood pressure check and lab appointment with a clinical assistant. If BP at goal, office visit in 6 months

## 2023-07-17 ENCOUNTER — LAB (OUTPATIENT)
Dept: LAB | Facility: CLINIC | Age: 63
End: 2023-07-17
Payer: COMMERCIAL

## 2023-07-17 ENCOUNTER — ALLIED HEALTH/NURSE VISIT (OUTPATIENT)
Dept: FAMILY MEDICINE | Facility: CLINIC | Age: 63
End: 2023-07-17
Payer: COMMERCIAL

## 2023-07-17 VITALS — DIASTOLIC BLOOD PRESSURE: 80 MMHG | SYSTOLIC BLOOD PRESSURE: 160 MMHG

## 2023-07-17 DIAGNOSIS — I10 PRIMARY HYPERTENSION: Primary | ICD-10-CM

## 2023-07-17 DIAGNOSIS — M10.072 ACUTE IDIOPATHIC GOUT INVOLVING TOE OF LEFT FOOT: ICD-10-CM

## 2023-07-17 DIAGNOSIS — I10 PRIMARY HYPERTENSION: ICD-10-CM

## 2023-07-17 LAB
ANION GAP SERPL CALCULATED.3IONS-SCNC: 11 MMOL/L (ref 7–15)
BUN SERPL-MCNC: 23.8 MG/DL (ref 8–23)
CALCIUM SERPL-MCNC: 9.3 MG/DL (ref 8.8–10.2)
CHLORIDE SERPL-SCNC: 104 MMOL/L (ref 98–107)
CREAT SERPL-MCNC: 1.22 MG/DL (ref 0.51–0.95)
DEPRECATED HCO3 PLAS-SCNC: 29 MMOL/L (ref 22–29)
GFR SERPL CREATININE-BSD FRML MDRD: 50 ML/MIN/1.73M2
GLUCOSE SERPL-MCNC: 99 MG/DL (ref 70–99)
POTASSIUM SERPL-SCNC: 3.2 MMOL/L (ref 3.4–5.3)
SODIUM SERPL-SCNC: 144 MMOL/L (ref 136–145)
URATE SERPL-MCNC: 7.7 MG/DL (ref 2.4–5.7)

## 2023-07-17 PROCEDURE — 36415 COLL VENOUS BLD VENIPUNCTURE: CPT

## 2023-07-17 PROCEDURE — 84550 ASSAY OF BLOOD/URIC ACID: CPT

## 2023-07-17 PROCEDURE — 99207 PR NO CHARGE NURSE ONLY: CPT

## 2023-07-17 PROCEDURE — 80048 BASIC METABOLIC PNL TOTAL CA: CPT

## 2023-07-17 NOTE — PROGRESS NOTES
I met with Adam Jernigan at the request of Isidra Marte DNP to recheck her blood pressure.  Blood pressure medications on the med list were reviewed with patient.    Patient has taken all medications as per usual regimen: Yes  Patient reports tolerating them without any issues or concerns: Yes    Vitals:    07/17/23 1518 07/17/23 1530   BP: (!) 170/78 (!) 160/80   BP Location: Right arm Right arm   Patient Position: Sitting Sitting   Cuff Size: Adult Regular Adult Regular       After 5 minutes, the patient's blood pressure remained greater than or equal to 140/90.    Is the patient currently having any chest pain? No  Does the patient currently have a headache? States she has a headache because se just woke up before arriving. She worked the 3rd shift and came home to sleep.   Does the patient currently have any vision changes? No  Does the patient currently have any nausea? No  Does the patient currently have any abdominal pain? No    The previous encounter was reviewed.  The patient was discharged and the note will be sent to the provider for final review.   Pt states she is not worried because when she checks it at home, the numbers are low.     Giorgio Mckeon Jr., CMA on 7/17/2023 at 3:33 PM

## 2023-07-18 DIAGNOSIS — I10 PRIMARY HYPERTENSION: Primary | ICD-10-CM

## 2023-07-18 RX ORDER — SPIRONOLACTONE 50 MG/1
50 TABLET, FILM COATED ORAL DAILY
Qty: 30 TABLET | Refills: 1 | Status: SHIPPED | OUTPATIENT
Start: 2023-07-18 | End: 2023-07-25

## 2023-07-18 NOTE — PROGRESS NOTES
Call pt- Her BP is still high and potassium is low. Increase spironolactone to 50 mg daily and I want to see her back in the office in 3 weeks for a visit.     Her uric acid is also high. Is she taking the allopurinol daily? Any missed doses?

## 2023-07-19 ENCOUNTER — TELEPHONE (OUTPATIENT)
Dept: INTERNAL MEDICINE | Facility: CLINIC | Age: 63
End: 2023-07-19
Payer: COMMERCIAL

## 2023-07-19 DIAGNOSIS — I10 PRIMARY HYPERTENSION: Primary | ICD-10-CM

## 2023-07-19 NOTE — TELEPHONE ENCOUNTER
Tried calling patient to relay message. Line kept ringing nonstop so writer could not leave message. Will try again later.    If patient calls back see below.

## 2023-07-19 NOTE — TELEPHONE ENCOUNTER
Isidra Marte, NPNurse PractitionerSigned  Yesterday                   Call pt- Her BP is still high and potassium is low. Increase spironolactone to 50 mg daily and I want to see her back in the office in 3 weeks for a visit.      Her uric acid is also high. Is she taking the allopurinol daily? Any missed doses?

## 2023-07-25 RX ORDER — SPIRONOLACTONE 25 MG/1
25 TABLET ORAL DAILY
Qty: 90 TABLET | Refills: 0 | Status: SHIPPED | OUTPATIENT
Start: 2023-07-25 | End: 2024-01-22

## 2023-07-25 NOTE — TELEPHONE ENCOUNTER
"Spoke with patient and relayed message below. She verbalized that when she got home and checked her blood pressure it was in the 130's. She states she started the new medication already. She declined scheduling an appointment with provider for a follow up. \"No I don't want to come in because when I checked my blood pressure it was fine at home. I will come in when my blood pressure is high\"    She states that she takes her allopurinol daily and has not missed any doses.      "

## 2023-07-25 NOTE — TELEPHONE ENCOUNTER
Please return call to patient and have her report out the home reading so we can update her chart. She should continue to check her blood pressure 2-3 times per week to make sure that readings remain at least below 140/90

## 2023-07-25 NOTE — TELEPHONE ENCOUNTER
See note below. Also, since her potassium has been consistently low, I recommend that she add more potassium to her diet and then we should recheck her level again in about 3-4 weeks. Please schedule lab appointment.

## 2023-07-26 VITALS — SYSTOLIC BLOOD PRESSURE: 135 MMHG | DIASTOLIC BLOOD PRESSURE: 86 MMHG

## 2023-07-26 NOTE — TELEPHONE ENCOUNTER
Spoke to patient and relayed messages below. She verbalized understanding and reported that her blood pressure reading today was 135/86. Flowsheet has been updated with new reading. Lab appointment scheduled on 08/23. Lab order parish'd up for future lab.

## 2023-08-03 ENCOUNTER — TELEPHONE (OUTPATIENT)
Dept: INTERNAL MEDICINE | Facility: CLINIC | Age: 63
End: 2023-08-03
Payer: COMMERCIAL

## 2023-08-03 DIAGNOSIS — I10 PRIMARY HYPERTENSION: Primary | ICD-10-CM

## 2023-08-03 DIAGNOSIS — M10.072 ACUTE IDIOPATHIC GOUT INVOLVING TOE OF LEFT FOOT: ICD-10-CM

## 2023-08-03 NOTE — PROGRESS NOTES
"This patient has a laboratory appointment scheduled 08/23/23. The notes say \"labs per MR\". There is a BMP order in the chart from Isidra Marte, however, the order is not signed. Please sign the order or advise the patient. Thank You!  "

## 2023-08-31 ENCOUNTER — LAB (OUTPATIENT)
Dept: LAB | Facility: CLINIC | Age: 63
End: 2023-08-31
Payer: COMMERCIAL

## 2023-08-31 DIAGNOSIS — M10.072 ACUTE IDIOPATHIC GOUT INVOLVING TOE OF LEFT FOOT: ICD-10-CM

## 2023-08-31 DIAGNOSIS — I10 PRIMARY HYPERTENSION: ICD-10-CM

## 2023-08-31 LAB
ANION GAP SERPL CALCULATED.3IONS-SCNC: 11 MMOL/L (ref 7–15)
BUN SERPL-MCNC: 26.9 MG/DL (ref 8–23)
CALCIUM SERPL-MCNC: 9.3 MG/DL (ref 8.8–10.2)
CHLORIDE SERPL-SCNC: 103 MMOL/L (ref 98–107)
CREAT SERPL-MCNC: 1.22 MG/DL (ref 0.51–0.95)
DEPRECATED HCO3 PLAS-SCNC: 27 MMOL/L (ref 22–29)
GFR SERPL CREATININE-BSD FRML MDRD: 50 ML/MIN/1.73M2
GLUCOSE SERPL-MCNC: 80 MG/DL (ref 70–99)
POTASSIUM SERPL-SCNC: 3.3 MMOL/L (ref 3.4–5.3)
SODIUM SERPL-SCNC: 141 MMOL/L (ref 136–145)
URATE SERPL-MCNC: 8 MG/DL (ref 2.4–5.7)

## 2023-08-31 PROCEDURE — 36415 COLL VENOUS BLD VENIPUNCTURE: CPT

## 2023-08-31 PROCEDURE — 80048 BASIC METABOLIC PNL TOTAL CA: CPT

## 2023-08-31 PROCEDURE — 84550 ASSAY OF BLOOD/URIC ACID: CPT

## 2023-09-01 DIAGNOSIS — I10 PRIMARY HYPERTENSION: ICD-10-CM

## 2023-09-01 DIAGNOSIS — M10.072 ACUTE IDIOPATHIC GOUT INVOLVING TOE OF LEFT FOOT: Primary | ICD-10-CM

## 2023-09-01 RX ORDER — ALLOPURINOL 200 MG/1
2 TABLET ORAL DAILY
Qty: 180 TABLET | Refills: 0 | Status: SHIPPED | OUTPATIENT
Start: 2023-09-01 | End: 2023-09-07

## 2023-09-01 RX ORDER — POTASSIUM CHLORIDE 1500 MG/1
20 TABLET, EXTENDED RELEASE ORAL DAILY
Qty: 90 TABLET | Refills: 0 | Status: SHIPPED | OUTPATIENT
Start: 2023-09-01 | End: 2024-03-01 | Stop reason: DRUGHIGH

## 2023-09-05 ENCOUNTER — TELEPHONE (OUTPATIENT)
Dept: INTERNAL MEDICINE | Facility: CLINIC | Age: 63
End: 2023-09-05
Payer: COMMERCIAL

## 2023-09-05 DIAGNOSIS — M10.072 ACUTE IDIOPATHIC GOUT INVOLVING TOE OF LEFT FOOT: ICD-10-CM

## 2023-09-06 NOTE — TELEPHONE ENCOUNTER
Central Prior Authorization Team   Phone: [phone]    PA Initiation    Medication: ALLOPURINOL 200 MG PO TABS  Insurance Company: [employer]-Specialty PA's - Phone [phone] Fax [phone]  Pharmacy Filling the Rx: [id number] - [place] [[address]  Filling Pharmacy Phone: [phone]  Filling Pharmacy Fax:    Start Date: 9/6/2023

## 2023-09-06 NOTE — TELEPHONE ENCOUNTER
PRIOR AUTHORIZATION DENIED    Medication: ALLOPURINOL 200 MG PO TABS  Insurance Company: [employer]-Specialty PA's - Phone [phone] Fax [phone]  Denial Date: 9/6/2023  Denial Rational: COVERAGE IS PROVIDED WHEN PATIENT HAS A SIGNIFICANT CLINICAL RATIONALE FOR WHY THEY ARE UNABLE TO USE 100MG OR 300MG TAB      Appeal Information: IF PROVIDER WOULD LIKE TO APPEAL THIS DECISION PLEASE PROVIDE PA TEAM WITH LETTER OF MEDICAL NECESSITY      Patient Notified: No

## 2023-09-07 RX ORDER — ALLOPURINOL 300 MG/1
300 TABLET ORAL DAILY
Qty: 90 TABLET | Refills: 0 | Status: SHIPPED | OUTPATIENT
Start: 2023-09-07 | End: 2024-01-22

## 2023-09-07 RX ORDER — ALLOPURINOL 100 MG/1
100 TABLET ORAL DAILY
Qty: 90 TABLET | Refills: 0 | Status: SHIPPED | OUTPATIENT
Start: 2023-09-07 | End: 2024-01-22

## 2023-09-07 NOTE — TELEPHONE ENCOUNTER
Call pt- her insurance does not cover allopurinol 200 mg tablets so I will send in a 300 mg and 100 mg tablet to take together for a total of 400 mg.

## 2024-01-10 DIAGNOSIS — I10 PRIMARY HYPERTENSION: ICD-10-CM

## 2024-01-10 RX ORDER — HYDROCHLOROTHIAZIDE 25 MG/1
25 TABLET ORAL DAILY
Qty: 90 TABLET | Refills: 0 | Status: SHIPPED | OUTPATIENT
Start: 2024-01-10 | End: 2024-06-05

## 2024-01-10 RX ORDER — LOSARTAN POTASSIUM 100 MG/1
100 TABLET ORAL DAILY
Qty: 90 TABLET | Refills: 0 | Status: SHIPPED | OUTPATIENT
Start: 2024-01-10 | End: 2024-06-05

## 2024-01-22 ENCOUNTER — OFFICE VISIT (OUTPATIENT)
Dept: INTERNAL MEDICINE | Facility: CLINIC | Age: 64
End: 2024-01-22
Payer: COMMERCIAL

## 2024-01-22 VITALS
HEART RATE: 61 BPM | WEIGHT: 123 LBS | SYSTOLIC BLOOD PRESSURE: 138 MMHG | OXYGEN SATURATION: 99 % | BODY MASS INDEX: 26.39 KG/M2 | DIASTOLIC BLOOD PRESSURE: 78 MMHG | RESPIRATION RATE: 18 BRPM

## 2024-01-22 DIAGNOSIS — M10.072 ACUTE IDIOPATHIC GOUT INVOLVING TOE OF LEFT FOOT: ICD-10-CM

## 2024-01-22 DIAGNOSIS — E78.2 MIXED HYPERLIPIDEMIA: ICD-10-CM

## 2024-01-22 DIAGNOSIS — I10 PRIMARY HYPERTENSION: Primary | ICD-10-CM

## 2024-01-22 DIAGNOSIS — N18.31 STAGE 3A CHRONIC KIDNEY DISEASE (H): ICD-10-CM

## 2024-01-22 LAB — HGB BLD-MCNC: 13.7 G/DL (ref 11.7–15.7)

## 2024-01-22 PROCEDURE — 80061 LIPID PANEL: CPT | Performed by: NURSE PRACTITIONER

## 2024-01-22 PROCEDURE — 84550 ASSAY OF BLOOD/URIC ACID: CPT | Performed by: NURSE PRACTITIONER

## 2024-01-22 PROCEDURE — 99214 OFFICE O/P EST MOD 30 MIN: CPT | Performed by: NURSE PRACTITIONER

## 2024-01-22 PROCEDURE — 36415 COLL VENOUS BLD VENIPUNCTURE: CPT | Performed by: NURSE PRACTITIONER

## 2024-01-22 PROCEDURE — 80048 BASIC METABOLIC PNL TOTAL CA: CPT | Performed by: NURSE PRACTITIONER

## 2024-01-22 PROCEDURE — 85018 HEMOGLOBIN: CPT | Performed by: NURSE PRACTITIONER

## 2024-01-22 RX ORDER — POTASSIUM CHLORIDE 1500 MG/1
20 TABLET, EXTENDED RELEASE ORAL DAILY
Qty: 90 TABLET | Refills: 0 | Status: CANCELLED | OUTPATIENT
Start: 2024-01-22

## 2024-01-22 RX ORDER — COLCHICINE 0.6 MG/1
0.6 TABLET ORAL DAILY
Qty: 90 TABLET | Refills: 1 | Status: SHIPPED | OUTPATIENT
Start: 2024-01-22

## 2024-01-22 RX ORDER — ALLOPURINOL 300 MG/1
300 TABLET ORAL DAILY
Qty: 90 TABLET | Refills: 1 | Status: SHIPPED | OUTPATIENT
Start: 2024-01-22 | End: 2024-03-04

## 2024-01-22 RX ORDER — SPIRONOLACTONE 25 MG/1
25 TABLET ORAL DAILY
Qty: 90 TABLET | Refills: 1 | Status: SHIPPED | OUTPATIENT
Start: 2024-01-22

## 2024-01-22 NOTE — PROGRESS NOTES
"  Assessment & Plan   Problem List Items Addressed This Visit       HTN (hypertension) - Primary     Per patient, home BP readings are better controlled than what we get here. Continue with current medications. Due for a repeat potassium/electrolyte check.          Relevant Medications    spironolactone (ALDACTONE) 25 MG tablet    Other Relevant Orders    Basic metabolic panel    HLD (hyperlipidemia), 10-year ASCVD risk 5.2% 2/2020    Relevant Orders    Lipid panel reflex to direct LDL Non-fasting    Chronic kidney disease, stage 3 (H)     Repeat BMP today. Avoid NSAIDs, continue good hydration          Relevant Orders    Hemoglobin    Acute idiopathic gout involving toe of left foot     She did not increase her dose of allopurinol to 400 mg daily, she misunderstood the instructions and never picked up the 100 mg tablets. She would prefer not to add another medication.          Relevant Medications    colchicine (COLCRYS) 0.6 MG tablet    allopurinol (ZYLOPRIM) 300 MG tablet    Other Relevant Orders    Uric acid      - She declines colon cancer screening, breast cancer screening, or cervical cancer screening        BMI  Estimated body mass index is 26.39 kg/m  as calculated from the following:    Height as of 6/26/23: 1.454 m (4' 9.24\").    Weight as of this encounter: 55.8 kg (123 lb).         Jose Medina is a 63 year old, presenting for the following health issues:  Follow Up (Blood pressure follow up /) and Refill Request        1/22/2024     2:25 PM   Additional Questions   Roomed by Sohan Saini   Accompanied by N/A     History of Present Illness       Hypertension: She presents for follow up of hypertension.  She does check blood pressure  regularly outside of the clinic. Outside blood pressures have been over 140/90. She does not follow a low salt diet.     She eats 4 or more servings of fruits and vegetables daily.She consumes 1 sweetened beverage(s) daily.She exercises with enough effort to increase her " heart rate 20 to 29 minutes per day.  She exercises with enough effort to increase her heart rate 7 days per week.   She is taking medications regularly.     The LE edema has resolved since we discontinued the amlodipine. Her systolic readings are generally between 126-128/60s. No lightheadedness or dizziness.         Objective    /78   Pulse 61   Resp 18   Wt 55.8 kg (123 lb)   SpO2 99%   BMI 26.39 kg/m    Body mass index is 26.39 kg/m .    Physical Exam   GENERAL: alert and no distress  RESP: lungs clear to auscultation - no rales, rhonchi or wheezes  CV: regular rate and rhythm, normal S1 S2, no S3 or S4, no murmur, click or rub, no peripheral edema              Signed Electronically by: Isidra Marte NP

## 2024-01-22 NOTE — ASSESSMENT & PLAN NOTE
She did not increase her dose of allopurinol to 400 mg daily, she misunderstood the instructions and never picked up the 100 mg tablets. She would prefer not to add another medication.

## 2024-01-22 NOTE — ASSESSMENT & PLAN NOTE
Per patient, home BP readings are better controlled than what we get here. Continue with current medications. Due for a repeat potassium/electrolyte check.

## 2024-01-22 NOTE — COMMUNITY RESOURCES LIST (ENGLISH)
01/22/2024   [facility] [place]  N/A  For questions about this resource list or additional care needs, please contact your primary care clinic or care manager.  Phone: [phone]   Email: N/A   Address: [address] [place] [id number]   Hours: N/A        Financial Stability       Rent and mortgage payment assistance  1  [facility] Distance: 1.53 miles      Phone/Virtual   [address] [place] [address]  Language: English  Hours: Mon - Fri 9:00 AM - 4:00 PM  Fees: Free   Phone: (421) 100-7380 Email: [email or web address] Website: [email or web address]     2  [employer]) - [place] [employer] - Supportive Housing Assistance Program (SHAP) - Rent and mortgage payment assistance Distance: 1.61 miles      Phone/Virtual   [address] [place] [address]  Language: English, Hmong, Ml, Belgian  Hours: Mon - Fri 8:30 AM - 5:00 PM  Fees: Free   Phone: (849) 130-4947 Email: [email or web address]@[email or web address] Website: http://www.[employer].org/hap-impact-areas/          Important Numbers & Websites       Emergency Services   911  [facility]   311  Poison Control   (828) 959-4654  Suicide Prevention Lifeline   (741) 368-9805 (TALK)  Child Abuse Hotline   (615) 573-2193 (4-A-Child)  Sexual Assault Hotline   (542) 904-1234 (HOPE)  National Runaway Safeline   (885) 426-6371 (RUNAWAY)  All-Options Talkline   (619) 845-3605  Substance Abuse Referral   (438) 269-3254 (HELP)

## 2024-01-23 LAB
ANION GAP SERPL CALCULATED.3IONS-SCNC: 5 MMOL/L (ref 7–15)
BUN SERPL-MCNC: 23.1 MG/DL (ref 8–23)
CALCIUM SERPL-MCNC: 9.3 MG/DL (ref 8.8–10.2)
CHLORIDE SERPL-SCNC: 104 MMOL/L (ref 98–107)
CHOLEST SERPL-MCNC: 220 MG/DL
CREAT SERPL-MCNC: 1.31 MG/DL (ref 0.51–0.95)
DEPRECATED HCO3 PLAS-SCNC: 33 MMOL/L (ref 22–29)
EGFRCR SERPLBLD CKD-EPI 2021: 46 ML/MIN/1.73M2
FASTING STATUS PATIENT QL REPORTED: NO
GLUCOSE SERPL-MCNC: 79 MG/DL (ref 70–99)
HDLC SERPL-MCNC: 48 MG/DL
LDLC SERPL CALC-MCNC: 110 MG/DL
NONHDLC SERPL-MCNC: 172 MG/DL
POTASSIUM SERPL-SCNC: 3.3 MMOL/L (ref 3.4–5.3)
SODIUM SERPL-SCNC: 142 MMOL/L (ref 135–145)
TRIGL SERPL-MCNC: 312 MG/DL
URATE SERPL-MCNC: 8.3 MG/DL (ref 2.4–5.7)

## 2024-01-25 DIAGNOSIS — M10.072 ACUTE IDIOPATHIC GOUT INVOLVING TOE OF LEFT FOOT: Primary | ICD-10-CM

## 2024-01-25 DIAGNOSIS — N18.31 STAGE 3A CHRONIC KIDNEY DISEASE (H): ICD-10-CM

## 2024-01-25 RX ORDER — ALLOPURINOL 200 MG/1
200 TABLET ORAL 2 TIMES DAILY
Qty: 180 TABLET | Refills: 0 | Status: SHIPPED | OUTPATIENT
Start: 2024-01-25 | End: 2024-03-04 | Stop reason: DRUGHIGH

## 2024-01-25 RX ORDER — POTASSIUM CHLORIDE 750 MG/1
10 TABLET, EXTENDED RELEASE ORAL DAILY
Qty: 90 TABLET | Refills: 1 | Status: SHIPPED | OUTPATIENT
Start: 2024-01-25

## 2024-01-26 ENCOUNTER — TELEPHONE (OUTPATIENT)
Dept: INTERNAL MEDICINE | Facility: CLINIC | Age: 64
End: 2024-01-26
Payer: COMMERCIAL

## 2024-01-26 PROBLEM — M10.072 ACUTE IDIOPATHIC GOUT INVOLVING TOE OF LEFT FOOT: Status: ACTIVE | Noted: 2023-04-25

## 2024-01-26 PROBLEM — N18.30 CHRONIC KIDNEY DISEASE, STAGE 3 (H): Status: ACTIVE | Noted: 2021-04-30

## 2024-01-26 NOTE — TELEPHONE ENCOUNTER
----- Message from Isidra Marte NP sent at 1/25/2024  9:10 AM CST -----  Call patient: Her uric acid level is high.  This indicates that she could be at risk for having gout flares.  I would like to have her increase the allopurinol to 200 mg twice daily.  I sent a new prescription to her pharmacy.  We should recheck a uric acid level in another 4 weeks.    I am concerned that her kidney function labs have worsened over the past 2 years.  Her creatinine is elevated at 1.31.  I am going to place an order for a kidney ultrasound.  She should receive a phone call to schedule this.    Her potassium is still low, I am going to order a potassium supplement which is taken once daily.  When we recheck her uric acid level I will also plan to repeat an electrolyte panel so we can see what her potassium is.

## 2024-01-26 NOTE — TELEPHONE ENCOUNTER
"Called pt and relayed PCP's message below. Pt states \"nothing is wrong with my kidney\" and declined kidney ultrasound. Pt declined to schedule lab appt for 4 weeks stating she has had too much blood drawn.     Writer again informed pt these are given recommendations from provider.     Pt verbalized understanding.   "

## 2024-02-29 ENCOUNTER — LAB (OUTPATIENT)
Dept: LAB | Facility: CLINIC | Age: 64
End: 2024-02-29
Payer: COMMERCIAL

## 2024-02-29 DIAGNOSIS — M10.072 ACUTE IDIOPATHIC GOUT INVOLVING TOE OF LEFT FOOT: ICD-10-CM

## 2024-02-29 DIAGNOSIS — N18.31 STAGE 3A CHRONIC KIDNEY DISEASE (H): ICD-10-CM

## 2024-02-29 PROCEDURE — 80048 BASIC METABOLIC PNL TOTAL CA: CPT

## 2024-02-29 PROCEDURE — 36415 COLL VENOUS BLD VENIPUNCTURE: CPT

## 2024-02-29 PROCEDURE — 84550 ASSAY OF BLOOD/URIC ACID: CPT

## 2024-03-01 ENCOUNTER — TELEPHONE (OUTPATIENT)
Dept: INTERNAL MEDICINE | Facility: CLINIC | Age: 64
End: 2024-03-01
Payer: COMMERCIAL

## 2024-03-01 DIAGNOSIS — M10.072 ACUTE IDIOPATHIC GOUT INVOLVING TOE OF LEFT FOOT: ICD-10-CM

## 2024-03-01 LAB
ANION GAP SERPL CALCULATED.3IONS-SCNC: 9 MMOL/L (ref 7–15)
BUN SERPL-MCNC: 16 MG/DL (ref 8–23)
CALCIUM SERPL-MCNC: 9.4 MG/DL (ref 8.8–10.2)
CHLORIDE SERPL-SCNC: 103 MMOL/L (ref 98–107)
CREAT SERPL-MCNC: 1.27 MG/DL (ref 0.51–0.95)
DEPRECATED HCO3 PLAS-SCNC: 30 MMOL/L (ref 22–29)
EGFRCR SERPLBLD CKD-EPI 2021: 47 ML/MIN/1.73M2
GLUCOSE SERPL-MCNC: 105 MG/DL (ref 70–99)
POTASSIUM SERPL-SCNC: 3.4 MMOL/L (ref 3.4–5.3)
SODIUM SERPL-SCNC: 142 MMOL/L (ref 135–145)
URATE SERPL-MCNC: 8.1 MG/DL (ref 2.4–5.7)

## 2024-03-01 NOTE — TELEPHONE ENCOUNTER
Pt states she is at work and requesting a call after 11AM    ----- Message from Isidra Marte NP sent at 3/1/2024  9:53 AM CST -----  Call pt- her labs still show that she has chronic kidney disease and if she would be open to doing the kidney ultrasound that I suggested in January, the order is on her chart. Her creatinine is stable compared to the last check.    Her uric acid for gout is still high. Is she taking allopurinol 200 mg twice per day?     Isidra

## 2024-03-01 NOTE — LETTER
"March 1, 2024      Adam Jernigan  [address] [place] [address]        Dear ,    We are writing to inform you of your test results.    \"her labs still show that she has chronic kidney disease and if she would be open to doing the kidney ultrasound that I suggested in January, the order is on her chart. Her creatinine is stable compared to the last check.     Her uric acid for gout is still high. Is she taking allopurinol 200 mg twice per day? \" Isidra Marte.     Please call [phone] to inform if you are taking your allopurinol twice per day.     You can call [phone] to schedule the ultrasound.     Resulted Orders   Basic metabolic panel   Result Value Ref Range    Sodium 142 135 - 145 mmol/L      Comment:      Reference intervals for this test were updated on 09/26/2023 to more accurately reflect our healthy population. There may be differences in the flagging of prior results with similar values performed with this method. Interpretation of those prior results can be made in the context of the updated reference intervals.     Potassium 3.4 3.4 - 5.3 mmol/L    Chloride 103 98 - 107 mmol/L    Carbon Dioxide (CO2) 30 (H) 22 - 29 mmol/L    Anion Gap 9 7 - 15 mmol/L    Urea Nitrogen 16.0 8.0 - 23.0 mg/dL    Creatinine 1.27 (H) 0.51 - 0.95 mg/dL    GFR Estimate 47 (L) >60 mL/min/1.73m2    Calcium 9.4 8.8 - 10.2 mg/dL    Glucose 105 (H) 70 - 99 mg/dL   Uric acid   Result Value Ref Range    Uric Acid 8.1 (H) 2.4 - 5.7 mg/dL       If you have any questions or concerns, please call the clinic at the number listed above.       Sincerely,  Kalli ALVAREZ RN                "

## 2024-03-04 RX ORDER — ALLOPURINOL 300 MG/1
300 TABLET ORAL 2 TIMES DAILY
Qty: 180 TABLET | Refills: 1 | Status: SHIPPED | OUTPATIENT
Start: 2024-03-04

## 2024-03-04 NOTE — TELEPHONE ENCOUNTER
Spoke with patient on lab results. She stated she is taking allopurinol 200 mg twice per day. She also declined to do US as she stated she doesn't feel anything.

## 2024-03-04 NOTE — TELEPHONE ENCOUNTER
Contacted patient and reviewed message below, informed new Rx has been sent to her pharmacy.  Offered to schedule follow up lab apt and patient declined, she will call back to schedule.

## 2024-05-08 NOTE — ASSESSMENT & PLAN NOTE
Pt states she has a seizure disorder was seen here last week for fall. Has had vaginal bleed enough to change a pad and two super tampons per hour. Pt states she had a seizure last night. Today has fever on arrival.    Well-controlled with current regimen. She will follow a high potassium diet and if she still has low potassium, we'll consider lowering the dose of hydrochlorothiazide to 12.5 mg daily

## 2024-06-04 DIAGNOSIS — I10 PRIMARY HYPERTENSION: ICD-10-CM

## 2024-06-04 RX ORDER — LOSARTAN POTASSIUM 100 MG/1
100 TABLET ORAL DAILY
Qty: 90 TABLET | Refills: 0 | OUTPATIENT
Start: 2024-06-04

## 2024-06-04 RX ORDER — HYDROCHLOROTHIAZIDE 25 MG/1
25 TABLET ORAL DAILY
Qty: 90 TABLET | Refills: 0 | OUTPATIENT
Start: 2024-06-04

## 2024-06-05 RX ORDER — LOSARTAN POTASSIUM 100 MG/1
100 TABLET ORAL DAILY
Qty: 90 TABLET | Refills: 0 | Status: SHIPPED | OUTPATIENT
Start: 2024-06-05

## 2024-06-05 RX ORDER — HYDROCHLOROTHIAZIDE 25 MG/1
25 TABLET ORAL DAILY
Qty: 90 TABLET | Refills: 0 | Status: SHIPPED | OUTPATIENT
Start: 2024-06-05

## 2024-11-06 ASSESSMENT — PATIENT HEALTH QUESTIONNAIRE - PHQ9: SUM OF ALL RESPONSES TO PHQ QUESTIONS 1-9: 16
